# Patient Record
Sex: FEMALE | Race: WHITE | Employment: OTHER | URBAN - METROPOLITAN AREA
[De-identification: names, ages, dates, MRNs, and addresses within clinical notes are randomized per-mention and may not be internally consistent; named-entity substitution may affect disease eponyms.]

---

## 2017-06-02 ENCOUNTER — TELEPHONE (OUTPATIENT)
Dept: FAMILY MEDICINE CLINIC | Facility: CLINIC | Age: 82
End: 2017-06-02

## 2017-06-02 NOTE — TELEPHONE ENCOUNTER
Jacquelyn Cruz received the worst sun burn of her life a few days ago on her back, right at her waist.   I asked if she has put aloe or an after sun lotion on it and she state she as not put anything on it, but showed it to her friends and they told her that it wou

## 2017-06-02 NOTE — TELEPHONE ENCOUNTER
She can use over the counter Cortaid 10 cream or ointment on the sore areas twice a day for the next 3-4 days.

## 2017-07-24 ENCOUNTER — TELEPHONE (OUTPATIENT)
Dept: FAMILY MEDICINE CLINIC | Facility: CLINIC | Age: 82
End: 2017-07-24

## 2017-07-24 RX ORDER — AMOXICILLIN 500 MG/1
500 CAPSULE ORAL 3 TIMES DAILY
Qty: 30 CAPSULE | Refills: 0 | Status: SHIPPED | OUTPATIENT
Start: 2017-07-24 | End: 2017-08-03

## 2017-07-24 NOTE — TELEPHONE ENCOUNTER
Patient has had a cough and sore throat- would like to speak to nurse because family in encouraging her to address this

## 2017-07-24 NOTE — TELEPHONE ENCOUNTER
Patient states She has started with a thick/swollen/somewhat painful throat. Patient also continues with cough. Family requesting Patient to get treatment due to Son being in the Hospital.  Please advise.   Isaac Nj, 07/24/17, 4:26 PM

## 2017-08-28 ENCOUNTER — TELEPHONE (OUTPATIENT)
Dept: FAMILY MEDICINE CLINIC | Facility: CLINIC | Age: 82
End: 2017-08-28

## 2017-08-28 NOTE — TELEPHONE ENCOUNTER
Pt states she has had a chronic cough with clear mucous for months. Pt denies fever, states she wondered if she needed an inhaler. Pt informed appt advised. Appt given Tuesday with MT.     Future Appointments  Date Time Provider Ina Briscoe   8/29/

## 2017-08-29 ENCOUNTER — OFFICE VISIT (OUTPATIENT)
Dept: FAMILY MEDICINE CLINIC | Facility: CLINIC | Age: 82
End: 2017-08-29

## 2017-08-29 ENCOUNTER — HOSPITAL ENCOUNTER (OUTPATIENT)
Dept: GENERAL RADIOLOGY | Age: 82
Discharge: HOME | End: 2017-08-29
Attending: FAMILY MEDICINE
Payer: MEDICARE

## 2017-08-29 VITALS
SYSTOLIC BLOOD PRESSURE: 146 MMHG | OXYGEN SATURATION: 97 % | HEART RATE: 72 BPM | HEIGHT: 61.5 IN | DIASTOLIC BLOOD PRESSURE: 78 MMHG | RESPIRATION RATE: 16 BRPM | WEIGHT: 175.38 LBS | BODY MASS INDEX: 32.69 KG/M2 | TEMPERATURE: 99 F

## 2017-08-29 DIAGNOSIS — J40 BRONCHITIS: ICD-10-CM

## 2017-08-29 DIAGNOSIS — R05.9 COUGH: ICD-10-CM

## 2017-08-29 DIAGNOSIS — R05.9 COUGH: Primary | ICD-10-CM

## 2017-08-29 DIAGNOSIS — J30.1 CHRONIC SEASONAL ALLERGIC RHINITIS DUE TO POLLEN: ICD-10-CM

## 2017-08-29 PROBLEM — Z85.43 HISTORY OF OVARIAN CANCER: Status: ACTIVE | Noted: 2017-08-29

## 2017-08-29 PROBLEM — I51.89 DIASTOLIC DYSFUNCTION: Status: ACTIVE | Noted: 2017-08-29

## 2017-08-29 PROCEDURE — 71020 XR CHEST PA + LAT CHEST (CPT=71020): CPT | Performed by: FAMILY MEDICINE

## 2017-08-29 PROCEDURE — 99214 OFFICE O/P EST MOD 30 MIN: CPT | Performed by: FAMILY MEDICINE

## 2017-08-29 RX ORDER — OMEGA-3 FATTY ACIDS/FISH OIL 300-1000MG
1 CAPSULE ORAL AS NEEDED
COMMUNITY
Start: 2015-07-25 | End: 2018-11-10

## 2017-08-29 RX ORDER — GUARN/MA-HUANG/P.GIN/S.GINSENG
1 TABLET ORAL DAILY
COMMUNITY

## 2017-08-29 RX ORDER — ANASTROZOLE 1 MG/1
1 TABLET ORAL DAILY
COMMUNITY
Start: 2016-04-26 | End: 2018-01-02

## 2017-08-29 RX ORDER — BUDESONIDE AND FORMOTEROL FUMARATE DIHYDRATE 160; 4.5 UG/1; UG/1
2 AEROSOL RESPIRATORY (INHALATION) 2 TIMES DAILY
Qty: 1 INHALER | Refills: 5 | Status: SHIPPED | OUTPATIENT
Start: 2017-08-29 | End: 2018-08-06

## 2017-08-29 RX ORDER — MULTIVITAMIN
1 CAPSULE ORAL DAILY
COMMUNITY
Start: 2012-05-08

## 2017-08-29 RX ORDER — BISOPROLOL FUMARATE 5 MG/1
1 TABLET ORAL DAILY
COMMUNITY
Start: 2015-02-06 | End: 2017-09-06

## 2017-08-29 NOTE — PROGRESS NOTES
[facility] [place]  [place]ESS [place]  Chief Complaint:   Patient presents with:  Cough: spitting up mucous      HPI:   This is a 80year old female coming in for a cough.   She said that she has been coughing and spitting up a lot of phlegm over the MCG/ACT Inhalation Aerosol Inhale 2 puffs into the lungs 2 (two) times daily. Disp: 1 Inhaler Rfl: 5      Counseling given: Not Answered       REVIEW OF SYSTEMS:   CONSTITUTIONAL:  Denies unusual weight gain/loss, fever, chills, or fatigue.   EENT:  Eyes: age, well groomed. Physical Exam:  GEN:  Patient is alert, awake and oriented, well developed, well nourished, no apparent distress.   HEENT:  Head:  Normocephalic, atraumatic Eyes: EOMI, PERRLA, no scleral icterus, conjunctivae clear bilaterally, no eye d Patient/Caregiver Education: Patient/Caregiver Education: There are no barriers to learning. Medical education done. Outcome: Patient verbalizes understanding.  Patient is notified to call with any questions, complications, allergies, or worseni

## 2017-08-29 NOTE — PATIENT INSTRUCTIONS
Take over the counter Loratadine 10 mg daily from now until a hard frost.  Use Symbicort 2 puffs twice a day.

## 2017-09-06 RX ORDER — BISOPROLOL FUMARATE 5 MG/1
TABLET ORAL DAILY
Qty: 30 TABLET | Refills: 11 | Status: SHIPPED | OUTPATIENT
Start: 2017-09-06 | End: 2018-09-18

## 2017-10-17 ENCOUNTER — MED REC SCAN ONLY (OUTPATIENT)
Dept: FAMILY MEDICINE CLINIC | Facility: CLINIC | Age: 82
End: 2017-10-17

## 2017-10-24 ENCOUNTER — TELEPHONE (OUTPATIENT)
Dept: FAMILY MEDICINE CLINIC | Facility: CLINIC | Age: 82
End: 2017-10-24

## 2017-10-24 DIAGNOSIS — Z85.3 HISTORY OF BREAST CANCER IN FEMALE: Primary | ICD-10-CM

## 2017-10-24 DIAGNOSIS — C50.911 MALIGNANT NEOPLASM OF RIGHT FEMALE BREAST, UNSPECIFIED ESTROGEN RECEPTOR STATUS, UNSPECIFIED SITE OF BREAST (HCC): ICD-10-CM

## 2017-10-24 NOTE — TELEPHONE ENCOUNTER
Order faxed to [facility] Dept and Patient Scheduling.   Curtis Pascual, 10/24/17, 10:30 AM

## 2017-10-24 NOTE — TELEPHONE ENCOUNTER
Per Gemma Solo-  Patient scheduled for routing mammogram today. Requesting order for Bilateral Diagnostic Mammogram.  Fax to: [phone].

## 2017-11-28 ENCOUNTER — OFFICE VISIT (OUTPATIENT)
Dept: FAMILY MEDICINE CLINIC | Facility: CLINIC | Age: 82
End: 2017-11-28

## 2017-11-28 VITALS
TEMPERATURE: 98 F | BODY MASS INDEX: 32.69 KG/M2 | WEIGHT: 175.38 LBS | DIASTOLIC BLOOD PRESSURE: 88 MMHG | RESPIRATION RATE: 16 BRPM | HEIGHT: 61.5 IN | SYSTOLIC BLOOD PRESSURE: 160 MMHG | HEART RATE: 66 BPM

## 2017-11-28 DIAGNOSIS — R30.0 DYSURIA: ICD-10-CM

## 2017-11-28 DIAGNOSIS — I10 BENIGN ESSENTIAL HYPERTENSION: ICD-10-CM

## 2017-11-28 DIAGNOSIS — H25.13 AGE-RELATED NUCLEAR CATARACT OF BOTH EYES: ICD-10-CM

## 2017-11-28 DIAGNOSIS — Z85.43 HISTORY OF OVARIAN CANCER: ICD-10-CM

## 2017-11-28 DIAGNOSIS — Z17.0 MALIGNANT NEOPLASM OF UPPER-OUTER QUADRANT OF RIGHT BREAST IN FEMALE, ESTROGEN RECEPTOR POSITIVE (HCC): ICD-10-CM

## 2017-11-28 DIAGNOSIS — C50.411 MALIGNANT NEOPLASM OF UPPER-OUTER QUADRANT OF RIGHT BREAST IN FEMALE, ESTROGEN RECEPTOR POSITIVE (HCC): ICD-10-CM

## 2017-11-28 DIAGNOSIS — Z01.818 PREOP EXAMINATION: Primary | ICD-10-CM

## 2017-11-28 PROCEDURE — 93000 ELECTROCARDIOGRAM COMPLETE: CPT | Performed by: FAMILY MEDICINE

## 2017-11-28 PROCEDURE — 81003 URINALYSIS AUTO W/O SCOPE: CPT | Performed by: FAMILY MEDICINE

## 2017-11-28 PROCEDURE — 99214 OFFICE O/P EST MOD 30 MIN: CPT | Performed by: FAMILY MEDICINE

## 2017-11-28 RX ORDER — CIPROFLOXACIN 250 MG/1
250 TABLET, FILM COATED ORAL 2 TIMES DAILY
Qty: 10 TABLET | Refills: 0 | Status: SHIPPED | OUTPATIENT
Start: 2017-11-28 | End: 2017-12-03

## 2017-11-28 RX ORDER — KETOROLAC TROMETHAMINE 5 MG/ML
1 SOLUTION OPHTHALMIC 2 TIMES DAILY
Refills: 3 | COMMUNITY
Start: 2017-11-17 | End: 2018-06-26 | Stop reason: ALTCHOICE

## 2017-11-28 NOTE — PROGRESS NOTES
[facility]  [facility]  Chief Complaint:   Patient presents with:  Pre-Op Exam      HPI:   This is a 80year old female coming in for her preoperative examination for bilateral cataract surgery.   The surgery will be performed by Dr. Lemus Part SURGERY      Comment: Incarcerated.   1995: HERNIA SURGERY  No date: HYSTERECTOMY  1999: KNEE REPLACEMENT SURGERY Right  2012: KNEE REPLACEMENT SURGERY Left  No date: REMOVAL OF OVARIAN CYST(S)  Social History:  Smoking status: Never Smoker wheezing, cough or sputum. GASTROINTESTINAL:  Denies abdominal pain, nausea, vomiting, constipation, diarrhea, or blood in stool. MUSCULOSKELETAL:  Denies weakness, muscle aches, back pain, joint pain, swelling or stiffness.   NEUROLOGICAL:  Denies headac nontender, bowel sounds normal in all 4 quadrants, no masses, no hepatosplenomegaly. BACK: Significant kyphosis noted. She does have some discomfort with flexion.   EXTREMITIES:  No edema, no cyanosis, no clubbing, FROM, 2+ dorsalis pedis pulses bilateral to call with any side effects or complications from the treatments as a result of today.      Problem List:  Patient Active Problem List:     Cough     History of DVT (deep vein thrombosis)     Edema     Routine general medical examination at a [facility]

## 2017-12-22 ENCOUNTER — TELEPHONE (OUTPATIENT)
Dept: FAMILY MEDICINE CLINIC | Facility: CLINIC | Age: 82
End: 2017-12-22

## 2017-12-22 DIAGNOSIS — N39.0 URINARY TRACT INFECTION WITHOUT HEMATURIA, SITE UNSPECIFIED: Primary | ICD-10-CM

## 2017-12-22 NOTE — TELEPHONE ENCOUNTER
Pt had recent UTI would like to check urine to ensure it is resolved. Having some increased urinary frequency--chronic issue per pt. Order placed.   Supplies to collect home given per pt request.

## 2017-12-23 ENCOUNTER — LAB ENCOUNTER (OUTPATIENT)
Dept: LAB | Age: 82
End: 2017-12-23
Attending: FAMILY MEDICINE
Payer: MEDICARE

## 2017-12-23 DIAGNOSIS — N39.0 URINARY TRACT INFECTION WITHOUT HEMATURIA, SITE UNSPECIFIED: ICD-10-CM

## 2017-12-23 PROCEDURE — 87184 SC STD DISK METHOD PER PLATE: CPT

## 2017-12-23 PROCEDURE — 87086 URINE CULTURE/COLONY COUNT: CPT

## 2017-12-23 PROCEDURE — 87088 URINE BACTERIA CULTURE: CPT

## 2017-12-23 PROCEDURE — 87077 CULTURE AEROBIC IDENTIFY: CPT

## 2017-12-23 PROCEDURE — 87186 SC STD MICRODIL/AGAR DIL: CPT

## 2017-12-23 PROCEDURE — 81001 URINALYSIS AUTO W/SCOPE: CPT

## 2017-12-28 ENCOUNTER — TELEPHONE (OUTPATIENT)
Dept: FAMILY MEDICINE CLINIC | Facility: CLINIC | Age: 82
End: 2017-12-28

## 2017-12-28 NOTE — TELEPHONE ENCOUNTER
Patient notified of results and recommendations. Notified Rx sent to [facility]. Patient expressed understanding and thanks.

## 2017-12-28 NOTE — TELEPHONE ENCOUNTER
----- Message from SVITLANA Johnson sent at 12/28/2017  8:18 AM CST -----  UTI still present, pt to start bactrim bid x 10 days. Repeat urinalysis reflex cx in 2 weeks. Orders placed.

## 2018-01-02 ENCOUNTER — OFFICE VISIT (OUTPATIENT)
Dept: FAMILY MEDICINE CLINIC | Facility: CLINIC | Age: 83
End: 2018-01-02

## 2018-01-02 VITALS
DIASTOLIC BLOOD PRESSURE: 78 MMHG | RESPIRATION RATE: 16 BRPM | TEMPERATURE: 98 F | HEART RATE: 80 BPM | BODY MASS INDEX: 31.43 KG/M2 | SYSTOLIC BLOOD PRESSURE: 134 MMHG | HEIGHT: 63 IN | WEIGHT: 177.38 LBS

## 2018-01-02 DIAGNOSIS — M54.50 CHRONIC MIDLINE LOW BACK PAIN WITHOUT SCIATICA: ICD-10-CM

## 2018-01-02 DIAGNOSIS — I10 BENIGN ESSENTIAL HYPERTENSION: ICD-10-CM

## 2018-01-02 DIAGNOSIS — R60.9 EDEMA, UNSPECIFIED TYPE: ICD-10-CM

## 2018-01-02 DIAGNOSIS — L03.115 CELLULITIS OF RIGHT LOWER EXTREMITY: Primary | ICD-10-CM

## 2018-01-02 DIAGNOSIS — G89.29 CHRONIC MIDLINE LOW BACK PAIN WITHOUT SCIATICA: ICD-10-CM

## 2018-01-02 DIAGNOSIS — I87.2 VENOUS STASIS DERMATITIS OF BOTH LOWER EXTREMITIES: ICD-10-CM

## 2018-01-02 PROCEDURE — 99214 OFFICE O/P EST MOD 30 MIN: CPT | Performed by: FAMILY MEDICINE

## 2018-01-02 RX ORDER — CEPHALEXIN 500 MG/1
1000 CAPSULE ORAL 2 TIMES DAILY
Qty: 40 CAPSULE | Refills: 0 | Status: SHIPPED | OUTPATIENT
Start: 2018-01-02 | End: 2018-06-26 | Stop reason: ALTCHOICE

## 2018-01-03 NOTE — PROGRESS NOTES
[facility] [place]  PROGRESS NOTE  Chief Complaint:   Patient presents with:  Leg Pain      HPI:   This is a 80year old female coming in for pain, redness, swelling in both lower legs.   She has had this many times in the past.  However, she has Pending    Urine Culture >100,000 CFU/ML Escherichia coli (A)    Urine Culture >100,000 CFU/ML Escherichia coli (A)    *Culture results found, see result in Chart Review         Past Medical History:   Diagnosis Date   • Benign essential hypertension 5/8/2 chills, or fatigue. EENT:  Eyes:  Denies eye pain, visual loss, blurred vision, double vision or yellow sclerae. Ears, Nose, Throat:  Denies hearing loss, sneezing, congestion, runny nose or sore throat.   INTEGUMENTARY:  Denies rashes, itching, skin lesio or exudate. Mouth:  No oral lesions or ulcerations, good dentition. NECK: Supple, no CLAD, no JVD, no thyromegaly. SKIN: No rashes, no skin lesion, no bruising, good turgor. HEART:  Regular rate and rhythm, no murmurs, rubs or gallops.   LUNGS: Clear to cephALEXin 500 MG Oral Cap 40 capsule 0      Sig: Take 2 capsules (1,000 mg total) by mouth 2 (two) times daily. Patient/Caregiver Education: Patient/Caregiver Education: There are no barriers to learning. Medical education done.    Outcome: Beth

## 2018-02-05 ENCOUNTER — TELEPHONE (OUTPATIENT)
Dept: FAMILY MEDICINE CLINIC | Facility: CLINIC | Age: 83
End: 2018-02-05

## 2018-02-05 NOTE — TELEPHONE ENCOUNTER
Patient will drop off the paperwork for handicap placard for Dr Burroughs November to fill out.   Jamal Salinas, 02/05/18, 1:55 PM

## 2018-02-12 ENCOUNTER — MED REC SCAN ONLY (OUTPATIENT)
Dept: FAMILY MEDICINE CLINIC | Facility: CLINIC | Age: 83
End: 2018-02-12

## 2018-06-22 ENCOUNTER — TELEPHONE (OUTPATIENT)
Dept: FAMILY MEDICINE CLINIC | Facility: CLINIC | Age: 83
End: 2018-06-22

## 2018-06-22 NOTE — TELEPHONE ENCOUNTER
Patient states She has been having increased lower back/sciatic pain. Will be seeing someone on Monday for back pain. States urinary frequency persists. Requesting appt for Medicare Physical.  Wanting UA run also.     Appt given Tues 6/26 2pm with Dr Kelly Schmitt

## 2018-06-26 ENCOUNTER — TELEPHONE (OUTPATIENT)
Dept: FAMILY MEDICINE CLINIC | Facility: CLINIC | Age: 83
End: 2018-06-26

## 2018-06-26 ENCOUNTER — OFFICE VISIT (OUTPATIENT)
Dept: FAMILY MEDICINE CLINIC | Facility: CLINIC | Age: 83
End: 2018-06-26

## 2018-06-26 ENCOUNTER — APPOINTMENT (OUTPATIENT)
Dept: LAB | Age: 83
End: 2018-06-26
Attending: FAMILY MEDICINE
Payer: MEDICARE

## 2018-06-26 VITALS
DIASTOLIC BLOOD PRESSURE: 80 MMHG | RESPIRATION RATE: 14 BRPM | HEART RATE: 70 BPM | HEIGHT: 63 IN | BODY MASS INDEX: 32.29 KG/M2 | WEIGHT: 182.25 LBS | TEMPERATURE: 98 F | SYSTOLIC BLOOD PRESSURE: 140 MMHG

## 2018-06-26 DIAGNOSIS — R30.0 DYSURIA: ICD-10-CM

## 2018-06-26 DIAGNOSIS — I87.2 VENOUS STASIS DERMATITIS OF BOTH LOWER EXTREMITIES: ICD-10-CM

## 2018-06-26 DIAGNOSIS — R60.9 EDEMA, UNSPECIFIED TYPE: ICD-10-CM

## 2018-06-26 DIAGNOSIS — I10 BENIGN ESSENTIAL HYPERTENSION: ICD-10-CM

## 2018-06-26 DIAGNOSIS — Z13.6 SCREENING FOR CARDIOVASCULAR CONDITION: ICD-10-CM

## 2018-06-26 DIAGNOSIS — Z00.00 ENCOUNTER FOR ANNUAL HEALTH EXAMINATION: Primary | ICD-10-CM

## 2018-06-26 DIAGNOSIS — Z17.0 MALIGNANT NEOPLASM OF UPPER-OUTER QUADRANT OF RIGHT BREAST IN FEMALE, ESTROGEN RECEPTOR POSITIVE (HCC): ICD-10-CM

## 2018-06-26 DIAGNOSIS — J30.1 CHRONIC SEASONAL ALLERGIC RHINITIS DUE TO POLLEN: ICD-10-CM

## 2018-06-26 DIAGNOSIS — I51.89 DIASTOLIC DYSFUNCTION: ICD-10-CM

## 2018-06-26 DIAGNOSIS — C50.411 MALIGNANT NEOPLASM OF UPPER-OUTER QUADRANT OF RIGHT BREAST IN FEMALE, ESTROGEN RECEPTOR POSITIVE (HCC): ICD-10-CM

## 2018-06-26 PROCEDURE — G0439 PPPS, SUBSEQ VISIT: HCPCS | Performed by: FAMILY MEDICINE

## 2018-06-26 PROCEDURE — 84443 ASSAY THYROID STIM HORMONE: CPT | Performed by: FAMILY MEDICINE

## 2018-06-26 PROCEDURE — 99214 OFFICE O/P EST MOD 30 MIN: CPT | Performed by: FAMILY MEDICINE

## 2018-06-26 PROCEDURE — 36415 COLL VENOUS BLD VENIPUNCTURE: CPT | Performed by: FAMILY MEDICINE

## 2018-06-26 PROCEDURE — 80061 LIPID PANEL: CPT | Performed by: FAMILY MEDICINE

## 2018-06-26 PROCEDURE — 81003 URINALYSIS AUTO W/O SCOPE: CPT | Performed by: FAMILY MEDICINE

## 2018-06-26 PROCEDURE — 80053 COMPREHEN METABOLIC PANEL: CPT | Performed by: FAMILY MEDICINE

## 2018-06-26 PROCEDURE — 85025 COMPLETE CBC W/AUTO DIFF WBC: CPT | Performed by: FAMILY MEDICINE

## 2018-06-26 RX ORDER — AMOXICILLIN 500 MG/1
CAPSULE ORAL
Refills: 0 | COMMUNITY
Start: 2018-04-27 | End: 2018-09-21

## 2018-06-26 RX ORDER — CIPROFLOXACIN 250 MG/1
250 TABLET, FILM COATED ORAL 2 TIMES DAILY
Qty: 20 TABLET | Refills: 0 | Status: SHIPPED | OUTPATIENT
Start: 2018-06-26 | End: 2018-07-06

## 2018-06-26 RX ORDER — FUROSEMIDE 20 MG/1
20 TABLET ORAL DAILY
Qty: 30 TABLET | Refills: 6 | Status: SHIPPED | OUTPATIENT
Start: 2018-06-26 | End: 2018-09-21

## 2018-06-26 NOTE — TELEPHONE ENCOUNTER
But he was able to drop off a urine sample today. Her urine does show a urinary tract infection. We will send in a prescription for Cipro for her to the pharmacy.

## 2018-06-26 NOTE — PATIENT INSTRUCTIONS
Take Furosemide 20 mg daily to help with swelling. Recommended Websites for Advanced Directives    [email or web address] org/publications/Documents/personal_[email or web address]. pdf  An information packet, including necessary form from the [facility]

## 2018-06-26 NOTE — PROGRESS NOTES
[facility] [place]  PROGRESS NOTE  Chief Complaint:   Patient presents with:  Physical      HPI:   This is a 80year old female coming in for her annual Medicare wellness exam.    She has several concerns.   She is finding it more more difficult Escherichia coli (A)    *Culture results found, see result in Chart Review         Past Medical History:   Diagnosis Date   • Benign essential hypertension 5/8/2012   • Edema 5/8/2012   • History of DVT (deep vein thrombosis) 5/8/2012   • Low back pain 8/1 runny nose or sore throat. INTEGUMENTARY:  Denies rashes, itching, skin lesion, or excessive skin dryness.   CARDIOVASCULAR:  Denies chest pain, chest pressure, chest discomfort, palpitations, edema, dyspnea on exertion or at rest.  RESPIRATORY:  Denies sh no JVD, no thyromegaly. SKIN: No rashes, no skin lesion, no bruising, good turgor. HEART:  Regular rate and rhythm, no murmurs, rubs or gallops. LUNGS: Clear to auscultation bilterally, no rales/rhonchi/wheezing.   Breasts: She has a surgical scar in the OFFICE/OUTPT VISIT,EST,LEVL IV    6.  Malignant neoplasm of upper-outer quadrant of right breast in female, estrogen receptor positive ([address]  She has a previous history of malignant neoplasm of the right breast.  There is no evidence for recurrence on physi

## 2018-06-27 ENCOUNTER — TELEPHONE (OUTPATIENT)
Dept: FAMILY MEDICINE CLINIC | Facility: CLINIC | Age: 83
End: 2018-06-27

## 2018-06-27 NOTE — TELEPHONE ENCOUNTER
----- Message from Davonna Sacks sent at 6/27/2018  4:10 PM CDT -----  Contact: patient   [facility]

## 2018-06-27 NOTE — TELEPHONE ENCOUNTER
----- Message from Janay Blanco MD sent at 6/27/2018  8:06 AM CDT -----  Please call [facility]. Her cholesterol is 143. That is really good. Her HDL cholesterol, the good one, is high at 66.   The LDL cholesterol, the one that increases the risk of hear

## 2018-08-06 RX ORDER — BUDESONIDE AND FORMOTEROL FUMARATE DIHYDRATE 160; 4.5 UG/1; UG/1
AEROSOL RESPIRATORY (INHALATION)
Qty: 1 INHALER | Refills: 11 | Status: SHIPPED | OUTPATIENT
Start: 2018-08-06 | End: 2019-10-03

## 2018-08-06 NOTE — TELEPHONE ENCOUNTER
Future appt:    Last Appointment:  6/26/2018    Cholesterol, Total (mg/dL)   Date Value   06/26/2018 143   ----------  HDL Cholesterol (mg/dL)   Date Value   06/26/2018 66   ----------  LDL Cholesterol (mg/dL)   Date Value   06/26/2018 64   ----------  Tri

## 2018-09-18 RX ORDER — BISOPROLOL FUMARATE 5 MG/1
TABLET ORAL
Qty: 90 TABLET | Refills: 3 | Status: SHIPPED | OUTPATIENT
Start: 2018-09-18 | End: 2019-09-09

## 2018-09-18 NOTE — TELEPHONE ENCOUNTER
Future appt:    Last Appointment:  6/26/2018  Cholesterol, Total (mg/dL)   Date Value   06/26/2018 143     HDL Cholesterol (mg/dL)   Date Value   06/26/2018 66     LDL Cholesterol (mg/dL)   Date Value   06/26/2018 64     Triglycerides (mg/dL)   Date Value

## 2018-09-21 ENCOUNTER — APPOINTMENT (OUTPATIENT)
Dept: LAB | Age: 83
End: 2018-09-21
Attending: FAMILY MEDICINE
Payer: MEDICARE

## 2018-09-21 ENCOUNTER — OFFICE VISIT (OUTPATIENT)
Dept: FAMILY MEDICINE CLINIC | Facility: CLINIC | Age: 83
End: 2018-09-21
Payer: MEDICARE

## 2018-09-21 ENCOUNTER — TELEPHONE (OUTPATIENT)
Dept: FAMILY MEDICINE CLINIC | Facility: CLINIC | Age: 83
End: 2018-09-21

## 2018-09-21 VITALS
RESPIRATION RATE: 18 BRPM | TEMPERATURE: 98 F | DIASTOLIC BLOOD PRESSURE: 72 MMHG | BODY MASS INDEX: 31.43 KG/M2 | HEART RATE: 60 BPM | SYSTOLIC BLOOD PRESSURE: 142 MMHG | WEIGHT: 177.38 LBS | HEIGHT: 63 IN

## 2018-09-21 DIAGNOSIS — L03.119 CELLULITIS OF LOWER EXTREMITY, UNSPECIFIED LATERALITY: ICD-10-CM

## 2018-09-21 DIAGNOSIS — R60.9 EDEMA, UNSPECIFIED TYPE: Primary | ICD-10-CM

## 2018-09-21 DIAGNOSIS — R60.9 EDEMA, UNSPECIFIED TYPE: ICD-10-CM

## 2018-09-21 LAB
ALBUMIN SERPL-MCNC: 3.3 G/DL (ref 3.5–4.8)
ALBUMIN/GLOB SERPL: 0.8 {RATIO} (ref 1–2)
ALP LIVER SERPL-CCNC: 109 U/L (ref 55–142)
ALT SERPL-CCNC: 22 U/L (ref 14–54)
ANION GAP SERPL CALC-SCNC: 4 MMOL/L (ref 0–18)
AST SERPL-CCNC: 19 U/L (ref 15–41)
BILIRUB SERPL-MCNC: 0.5 MG/DL (ref 0.1–2)
BUN BLD-MCNC: 18 MG/DL (ref 8–20)
BUN/CREAT SERPL: 22.2 (ref 10–20)
CALCIUM BLD-MCNC: 8.7 MG/DL (ref 8.3–10.3)
CHLORIDE SERPL-SCNC: 110 MMOL/L (ref 101–111)
CO2 SERPL-SCNC: 27 MMOL/L (ref 22–32)
CREAT BLD-MCNC: 0.81 MG/DL (ref 0.55–1.02)
GLOBULIN PLAS-MCNC: 4.2 G/DL (ref 2.5–4)
GLUCOSE BLD-MCNC: 89 MG/DL (ref 70–99)
M PROTEIN MFR SERPL ELPH: 7.5 G/DL (ref 6.1–8.3)
OSMOLALITY SERPL CALC.SUM OF ELEC: 293 MOSM/KG (ref 275–295)
POTASSIUM SERPL-SCNC: 4.3 MMOL/L (ref 3.6–5.1)
SODIUM SERPL-SCNC: 141 MMOL/L (ref 136–144)

## 2018-09-21 PROCEDURE — 36415 COLL VENOUS BLD VENIPUNCTURE: CPT

## 2018-09-21 PROCEDURE — 80053 COMPREHEN METABOLIC PANEL: CPT

## 2018-09-21 PROCEDURE — 99215 OFFICE O/P EST HI 40 MIN: CPT | Performed by: FAMILY MEDICINE

## 2018-09-21 RX ORDER — POTASSIUM CHLORIDE 750 MG/1
10 TABLET, FILM COATED, EXTENDED RELEASE ORAL DAILY
Qty: 15 TABLET | Refills: 0 | Status: SHIPPED | OUTPATIENT
Start: 2018-09-21 | End: 2018-10-01

## 2018-09-21 RX ORDER — FUROSEMIDE 20 MG/1
20 TABLET ORAL 2 TIMES DAILY
Qty: 30 TABLET | Refills: 6 | COMMUNITY
Start: 2018-09-21 | End: 2018-10-18

## 2018-09-21 RX ORDER — CEPHALEXIN 500 MG/1
500 CAPSULE ORAL 3 TIMES DAILY
Qty: 30 CAPSULE | Refills: 0 | Status: SHIPPED | OUTPATIENT
Start: 2018-09-21 | End: 2018-09-25

## 2018-09-21 NOTE — PATIENT INSTRUCTIONS
Check Venous doppler US today at [address] [place] [address] Street.   Start Cephalexin three times a day for 10 days. Also increase furosemide 20 mg twice a day for 2 weeks. Start potassium 10 meq once a day for 2 weeks.    Follow up with Dr Janna Flowers or me in 7-10 da

## 2018-09-21 NOTE — PROGRESS NOTES
[facility] [place]  PROGRESS NOTE  Chief Complaint:   Patient presents with:  Redness: redness in legs for this week, monday      HPI:   This is a 80year old female presents to clinic complaining of lower extremities swelling and redness that s mouth daily. Disp: 15 tablet Rfl: 0   furosemide 20 MG Oral Tab Take 1 tablet (20 mg total) by mouth 2 (two) times daily.  Disp: 30 tablet Rfl: 6   BISOPROLOL FUMARATE 5 MG Oral Tab TAKE ONE TABLET BY MOUTH EVERY DAY Disp: 90 tablet Rfl: 3   SYMBICORT 160-4 signs reviewed. Physical Exam:  GEN:  Patient is alert, awake and oriented, well developed, well nourished, no acute distress. EYES:  Sclera anicteric, conjunctiva normal, PERRLA, EOMI. LUNGS: Clear to auscultation bilterally, no rales/rhonchi/wheezing. Risk(1 of 2 - PCV13) due on 03/11/1999  Influenza Vaccine(1) due on 09/01/2018    Patient/Caregiver Education: Patient/Caregiver Education: There are no barriers to learning. Medical education done. Outcome: Patient verbalizes understanding.  Patient is n

## 2018-09-21 NOTE — TELEPHONE ENCOUNTER
Pt called stating her leg are more red the last few days. Pt states she has issues with her legs (red and hardness) but is more red then normal.    Dr. Harjit Chilel has no appt today schedule with Dr. Spencer Torres.     Future Appointments   Date Time Provider Depa

## 2018-09-24 ENCOUNTER — TELEPHONE (OUTPATIENT)
Dept: FAMILY MEDICINE CLINIC | Facility: CLINIC | Age: 83
End: 2018-09-24

## 2018-09-24 NOTE — TELEPHONE ENCOUNTER
----- Message from Peter Pulido MD sent at 9/24/2018  8:27 AM CDT -----  Please inform patient that CMP done here in clinic shows normal labs. Patient was also recently hospitalized for bilateral DVT, patient was discharged from hospital yesterday.   Francis

## 2018-09-24 NOTE — TELEPHONE ENCOUNTER
Pt notified and verbalized understanding. She states that she is feeling a little tired today but better then before. She states that she scheduled an appt with Dr. Mickey Hatch.     Future Appointments   Date Time Provider nIa Briscoe   9/25/2018  1:00 P

## 2018-09-25 ENCOUNTER — OFFICE VISIT (OUTPATIENT)
Dept: FAMILY MEDICINE CLINIC | Facility: CLINIC | Age: 83
End: 2018-09-25
Payer: MEDICARE

## 2018-09-25 VITALS
BODY MASS INDEX: 32.43 KG/M2 | RESPIRATION RATE: 16 BRPM | HEART RATE: 96 BPM | WEIGHT: 174 LBS | DIASTOLIC BLOOD PRESSURE: 70 MMHG | HEIGHT: 61.5 IN | SYSTOLIC BLOOD PRESSURE: 140 MMHG | TEMPERATURE: 99 F

## 2018-09-25 DIAGNOSIS — I10 BENIGN ESSENTIAL HYPERTENSION: ICD-10-CM

## 2018-09-25 DIAGNOSIS — Z17.0 MALIGNANT NEOPLASM OF UPPER-OUTER QUADRANT OF RIGHT BREAST IN FEMALE, ESTROGEN RECEPTOR POSITIVE (HCC): ICD-10-CM

## 2018-09-25 DIAGNOSIS — I82.433 ACUTE DEEP VEIN THROMBOSIS (DVT) OF POPLITEAL VEIN OF BOTH LOWER EXTREMITIES (HCC): Primary | ICD-10-CM

## 2018-09-25 DIAGNOSIS — C50.411 MALIGNANT NEOPLASM OF UPPER-OUTER QUADRANT OF RIGHT BREAST IN FEMALE, ESTROGEN RECEPTOR POSITIVE (HCC): ICD-10-CM

## 2018-09-25 PROBLEM — M19.90 OSTEOARTHROSIS: Status: ACTIVE | Noted: 2018-09-25

## 2018-09-25 PROBLEM — I49.9 CARDIAC ARRHYTHMIA: Status: ACTIVE | Noted: 2018-09-25

## 2018-09-25 PROBLEM — I82.403 DVT, BILATERAL LOWER LIMBS (HCC): Status: ACTIVE | Noted: 2018-09-21

## 2018-09-25 PROCEDURE — 99214 OFFICE O/P EST MOD 30 MIN: CPT | Performed by: FAMILY MEDICINE

## 2018-09-25 PROCEDURE — 1111F DSCHRG MED/CURRENT MED MERGE: CPT | Performed by: FAMILY MEDICINE

## 2018-09-25 RX ORDER — CEPHALEXIN 500 MG/1
500 CAPSULE ORAL 3 TIMES DAILY
COMMUNITY
End: 2018-10-03

## 2018-09-25 NOTE — PROGRESS NOTES
[facility]  [facility]  Chief Complaint:   Patient presents with:  Hospital F/U      HPI:   This is a 80year old female coming in for hospital follow-up. She was seen in the office on September 21 with redness and pain in her legs. Non- 67 >=60    GFR, -American 77 >=60    AST 19 15 - 41 U/L    Alt 22 14 - 54 U/L    Alkaline Phosphatase 109 55 - 142 U/L    Bilirubin, Total 0.5 0.1 - 2.0 mg/dL    Total Protein 7.5 6.1 - 8.3 g/dL    Albumin 3.3 (L) 3.5 - 4.8 g/dL Oral Cap Take 1 capsule by mouth daily. Disp:  Rfl:    Calcium 600-200 MG-UNIT Oral Tab Take 1 tablet by mouth daily.  Disp:  Rfl:       Counseling given: Not Answered       REVIEW OF SYSTEMS:   CONSTITUTIONAL: She has been extra tired since her hospital di walker. She is awake and alert. She is very hard of hearing. She walks with a walker.   HEENT:  Head:  Normocephalic, atraumatic Eyes: EOMI, PERRLA, no scleral icterus, conjunctivae clear bilaterally, no eye discharge Ears: External normal. Nose: patent, 03/11/1999  Mammogram due on 10/24/2018    Patient/Caregiver Education: Patient/Caregiver Education: There are no barriers to learning. Medical education done. Outcome: Patient verbalizes understanding.  Patient is notified to call with any questions, com

## 2018-10-02 RX ORDER — POTASSIUM CHLORIDE 750 MG/1
10 TABLET, FILM COATED, EXTENDED RELEASE ORAL DAILY
Qty: 90 TABLET | Refills: 3 | Status: SHIPPED | OUTPATIENT
Start: 2018-10-02 | End: 2019-09-19

## 2018-10-02 NOTE — TELEPHONE ENCOUNTER
Future appt:    Last Appointment:  9/21/2018  Cholesterol, Total (mg/dL)   Date Value   06/26/2018 143     HDL Cholesterol (mg/dL)   Date Value   06/26/2018 66     LDL Cholesterol (mg/dL)   Date Value   06/26/2018 64     Triglycerides (mg/dL)   Date Value

## 2018-10-05 ENCOUNTER — TELEPHONE (OUTPATIENT)
Dept: FAMILY MEDICINE CLINIC | Facility: CLINIC | Age: 83
End: 2018-10-05

## 2018-10-05 RX ORDER — INHALER, ASSIST DEVICES
1 SPACER (EA) MISCELLANEOUS AS NEEDED
Qty: 1 DEVICE | Refills: 0 | Status: SHIPPED | OUTPATIENT
Start: 2018-10-05

## 2018-10-05 RX ORDER — INHALER, ASSIST DEVICES
1 SPACER (EA) MISCELLANEOUS AS NEEDED
COMMUNITY
Start: 2018-10-05 | End: 2018-10-05

## 2018-10-09 ENCOUNTER — TELEPHONE (OUTPATIENT)
Dept: FAMILY MEDICINE CLINIC | Facility: CLINIC | Age: 83
End: 2018-10-09

## 2018-10-09 ENCOUNTER — OFFICE VISIT (OUTPATIENT)
Dept: FAMILY MEDICINE CLINIC | Facility: CLINIC | Age: 83
End: 2018-10-09
Payer: MEDICARE

## 2018-10-09 VITALS
BODY MASS INDEX: 32.85 KG/M2 | WEIGHT: 176.25 LBS | HEIGHT: 61.5 IN | RESPIRATION RATE: 14 BRPM | HEART RATE: 70 BPM | DIASTOLIC BLOOD PRESSURE: 72 MMHG | SYSTOLIC BLOOD PRESSURE: 128 MMHG | TEMPERATURE: 98 F

## 2018-10-09 DIAGNOSIS — L03.115 CELLULITIS OF RIGHT LOWER EXTREMITY: Primary | ICD-10-CM

## 2018-10-09 DIAGNOSIS — I87.2 VENOUS STASIS DERMATITIS OF BOTH LOWER EXTREMITIES: ICD-10-CM

## 2018-10-09 PROBLEM — D68.51 HETEROZYGOUS FACTOR V LEIDEN MUTATION (HCC): Status: ACTIVE | Noted: 2018-09-25

## 2018-10-09 PROCEDURE — 99213 OFFICE O/P EST LOW 20 MIN: CPT | Performed by: FAMILY MEDICINE

## 2018-10-09 RX ORDER — ANASTROZOLE 1 MG/1
1 TABLET ORAL DAILY
COMMUNITY
Start: 2018-10-05 | End: 2021-03-12

## 2018-10-09 RX ORDER — AMOXICILLIN AND CLAVULANATE POTASSIUM 875; 125 MG/1; MG/1
1 TABLET, FILM COATED ORAL 2 TIMES DAILY
Qty: 20 TABLET | Refills: 0 | Status: SHIPPED | OUTPATIENT
Start: 2018-10-09 | End: 2018-10-19

## 2018-10-09 NOTE — PROGRESS NOTES
[facility] [place]  PROGRESS NOTE  Chief Complaint:   Patient presents with:  Leg Pain: Left leg redness, uncomftorable to walk      HPI:   This is a 80year old female coming in for redness and discomfort in her leg.   She said she finished taki Left 2012   • REMOVAL OF OVARIAN CYST(S)       Social History:  Social History    Tobacco Use      Smoking status: Never Smoker      Smokeless tobacco: Never Used    Alcohol use: No    Drug use: No    Family History:  No family history on file.   Allergies: Denies chest pain, chest pressure, chest discomfort, palpitations, edema, dyspnea on exertion or at rest.  RESPIRATORY:  Denies shortness of breath, wheezing, cough or sputum.   GASTROINTESTINAL:  Denies abdominal pain, nausea, vomiting, constipation, diarr rales/rhonchi/wheezing. ABDOMEN:  Soft, nondistended, nontender, bowel sounds normal in all 4 quadrants, no masses, no hepatosplenomegaly.   EXTREMITIES: Right leg: Small amount of redness noted from the mid calf down to the top of the ankle, especially po cancer     Chronic seasonal allergic rhinitis due to pollen     Bronchitis     Preop examination     Age-related nuclear cataract of both eyes     Dysuria     Cellulitis of right lower extremity     Venous stasis dermatitis of both lower extremities     Ca

## 2018-10-09 NOTE — TELEPHONE ENCOUNTER
Patient states She finished Keflex last week. States her left leg remains with some redness and pain. Requesting evaluated. Appt given today 3pm with Dr Quintin Mclean.   Shanna Herron, 10/09/18, 9:17 AM

## 2018-10-18 ENCOUNTER — TELEPHONE (OUTPATIENT)
Dept: FAMILY MEDICINE CLINIC | Facility: CLINIC | Age: 83
End: 2018-10-18

## 2018-10-18 RX ORDER — FUROSEMIDE 20 MG/1
20 TABLET ORAL 2 TIMES DAILY
Qty: 60 TABLET | Refills: 1 | Status: SHIPPED | OUTPATIENT
Start: 2018-10-18 | End: 2019-01-07

## 2018-10-18 NOTE — TELEPHONE ENCOUNTER
No future appointments. Return in about 2 months (around 11/25/2018). Patient was just seen one time by Dr Peyton Noble and Dr Peyton Noble did not refill this medication and has seen Dr Gretel Madera recently.

## 2018-10-18 NOTE — TELEPHONE ENCOUNTER
Detailed message left that the RF has been given. Asked for a c/b if patient needs any further assistance.

## 2018-10-31 ENCOUNTER — MED REC SCAN ONLY (OUTPATIENT)
Dept: FAMILY MEDICINE CLINIC | Facility: CLINIC | Age: 83
End: 2018-10-31

## 2018-11-02 ENCOUNTER — TELEPHONE (OUTPATIENT)
Dept: FAMILY MEDICINE CLINIC | Facility: CLINIC | Age: 83
End: 2018-11-02

## 2018-11-02 RX ORDER — CEPHALEXIN 250 MG/1
250 CAPSULE ORAL 4 TIMES DAILY
Qty: 40 CAPSULE | Refills: 0 | Status: SHIPPED | OUTPATIENT
Start: 2018-11-02 | End: 2018-11-05

## 2018-11-02 NOTE — TELEPHONE ENCOUNTER
Patient states that the infection never really went away she doesn't think, patient states that its been 2 weeks since last abx and is wondering if she would need another one?      Patient states that her legs are still a little red but still not as bad it

## 2018-11-02 NOTE — TELEPHONE ENCOUNTER
Patient has cellulitis, would like to know if she has to make appointment with dr for him to look at his legs again.

## 2018-11-05 ENCOUNTER — OFFICE VISIT (OUTPATIENT)
Dept: FAMILY MEDICINE CLINIC | Facility: CLINIC | Age: 83
End: 2018-11-05
Payer: COMMERCIAL

## 2018-11-05 ENCOUNTER — TELEPHONE (OUTPATIENT)
Dept: FAMILY MEDICINE CLINIC | Facility: CLINIC | Age: 83
End: 2018-11-05

## 2018-11-05 VITALS
HEART RATE: 76 BPM | RESPIRATION RATE: 16 BRPM | TEMPERATURE: 97 F | BODY MASS INDEX: 32.83 KG/M2 | DIASTOLIC BLOOD PRESSURE: 64 MMHG | SYSTOLIC BLOOD PRESSURE: 124 MMHG | HEIGHT: 61.5 IN | WEIGHT: 176.13 LBS

## 2018-11-05 DIAGNOSIS — L03.119 CELLULITIS OF LOWER EXTREMITY, UNSPECIFIED LATERALITY: Primary | ICD-10-CM

## 2018-11-05 DIAGNOSIS — R60.9 EDEMA, UNSPECIFIED TYPE: ICD-10-CM

## 2018-11-05 PROCEDURE — 99214 OFFICE O/P EST MOD 30 MIN: CPT | Performed by: FAMILY MEDICINE

## 2018-11-05 RX ORDER — SULFAMETHOXAZOLE AND TRIMETHOPRIM 800; 160 MG/1; MG/1
1 TABLET ORAL 2 TIMES DAILY
Qty: 20 TABLET | Refills: 0 | Status: SHIPPED | OUTPATIENT
Start: 2018-11-05 | End: 2018-11-10

## 2018-11-05 NOTE — PROGRESS NOTES
[address]  PROGRESS NOTE  Chief Complaint:   Patient presents with:   Other: Swollen,red and itchy legs      HPI:   This is a 80year old female with history of  cellulitis in her lower extremity multiple time presents for evaluation of Disp: 60 tablet Rfl: 1   anastrozole 1 MG Oral Tab tab Take 1 mg by mouth daily. Disp:  Rfl:    Spacer/Aero-Holding Chambers (AEROCHAMBER MINI CHAMBER) Does not apply Device 1 each by Does not apply route as needed (Use with Symbicort Inhaler).  Disp: 1 Dev Estimated body mass index is 32.74 kg/m² as calculated from the following:    Height as of this encounter: 61.5\". Weight as of this encounter: 176 lb 2 oz. Vital signs reviewed.   Physical Exam:  GEN:  Patient is alert, awake and oriented, well develo notified to call with any questions, complications, allergies, or worsening or changing symptoms. Patient is to call with any side effects or complications from the treatments as a result of today.      Problem List:  Patient Active Problem List:     Cough

## 2018-11-05 NOTE — PATIENT INSTRUCTIONS
Recommend to stop other antibiotics. Start Bactrim, new antibiotics. Continue with furosemide. Recommend to switch to different stocking or not wearing stocking for 3-4 days. Recommend probiotics tablets.   Return to clinic next week if no improveme

## 2018-11-05 NOTE — TELEPHONE ENCOUNTER
Patient questions if Keflex is helping her cellulitis or not. States cellulitis really flared up yesterday. Today it is not as painful. Appt given today 1:40 with Dr Zuly Lozano for evaluation of cellulitis.   Eri Steiner, 11/05/18, 9:33 AM

## 2018-11-06 ENCOUNTER — MED REC SCAN ONLY (OUTPATIENT)
Dept: FAMILY MEDICINE CLINIC | Facility: CLINIC | Age: 83
End: 2018-11-06

## 2018-11-09 ENCOUNTER — TELEPHONE (OUTPATIENT)
Dept: FAMILY MEDICINE CLINIC | Facility: CLINIC | Age: 83
End: 2018-11-09

## 2018-11-09 NOTE — TELEPHONE ENCOUNTER
Patient is very concerned regarding her leg cullulitis. Continues to be red/painful. Started Bactrim DS on Tuesday. Would like to see Dr Mickey Hatch. Appt given Sat 11/10 11am with Dr Mickey Hatch.   Loni Lau, 11/09/18, 9:50 AM

## 2018-11-10 ENCOUNTER — OFFICE VISIT (OUTPATIENT)
Dept: FAMILY MEDICINE CLINIC | Facility: CLINIC | Age: 83
End: 2018-11-10
Payer: MEDICARE

## 2018-11-10 VITALS
SYSTOLIC BLOOD PRESSURE: 128 MMHG | RESPIRATION RATE: 20 BRPM | WEIGHT: 175.19 LBS | HEART RATE: 80 BPM | TEMPERATURE: 99 F | DIASTOLIC BLOOD PRESSURE: 68 MMHG | BODY MASS INDEX: 32.24 KG/M2 | HEIGHT: 62 IN

## 2018-11-10 DIAGNOSIS — I10 BENIGN ESSENTIAL HYPERTENSION: ICD-10-CM

## 2018-11-10 DIAGNOSIS — I80.9 SUPERFICIAL PHLEBITIS: Primary | ICD-10-CM

## 2018-11-10 PROCEDURE — 99213 OFFICE O/P EST LOW 20 MIN: CPT | Performed by: FAMILY MEDICINE

## 2018-11-10 RX ORDER — MELOXICAM 15 MG/1
15 TABLET ORAL DAILY
Qty: 15 TABLET | Refills: 1 | Status: SHIPPED | OUTPATIENT
Start: 2018-11-10 | End: 2021-03-12

## 2018-11-10 NOTE — PROGRESS NOTES
[address]  PROGRESS NOTE  Chief Complaint:   Patient presents with: Follow - Up: Cellulitis      HPI:   This is a 80year old female coming in for follow-up on the redness and pain in her legs.   She has had redness and warmth and disco Past Medical History:   Diagnosis Date   • Benign essential hypertension 5/8/2012   • Edema 5/8/2012   • History of DVT (deep vein thrombosis) 5/8/2012   • Low back pain 8/16/2016   • [facility] 6/19/2015   • Shruti Peraza Disp:  Rfl:       Counseling given: Not Answered       REVIEW OF SYSTEMS:   CONSTITUTIONAL:  Denies unusual weight gain/loss, fever, chills, or fatigue. EENT:  Eyes:  Denies eye pain, visual loss, blurred vision, double vision or yellow sclerae.  Ears, Nos There are no cords palpated in the posterior calf. NEURO:  No deficit, normal gait, strength and tone, sensory intact, normal reflexes. ASSESSMENT AND PLAN:   1.  Superficial phlebitis  She has had cellulitis in the past but this looks much more like a Age-related nuclear cataract of both eyes     Dysuria     Cellulitis of right lower extremity     Venous stasis dermatitis of both lower extremities     Cardiac arrhythmia     DVT, bilateral lower limbs (HCC)     Osteoarthrosis     Ovarian cancer ([address]

## 2018-11-10 NOTE — PATIENT INSTRUCTIONS
Stop taking Bactrim. Take Meloxicam 15 mg daily for 2 weeks; may refill it if needed. Try to avoid standing as much as possible; OK to walk around.

## 2018-11-30 NOTE — TELEPHONE ENCOUNTER
Future appt:    Last Appointment:  11/10/2018  Cholesterol, Total (mg/dL)   Date Value   06/26/2018 143     HDL Cholesterol (mg/dL)   Date Value   06/26/2018 66     LDL Cholesterol (mg/dL)   Date Value   06/26/2018 64     Triglycerides (mg/dL)   Date Value

## 2019-01-07 RX ORDER — FUROSEMIDE 20 MG/1
20 TABLET ORAL 2 TIMES DAILY
Qty: 60 TABLET | Refills: 2 | Status: SHIPPED | OUTPATIENT
Start: 2019-01-07 | End: 2019-04-02

## 2019-02-13 ENCOUNTER — OFFICE VISIT (OUTPATIENT)
Dept: FAMILY MEDICINE CLINIC | Facility: CLINIC | Age: 84
End: 2019-02-13
Payer: MEDICARE

## 2019-02-13 VITALS
SYSTOLIC BLOOD PRESSURE: 124 MMHG | DIASTOLIC BLOOD PRESSURE: 72 MMHG | BODY MASS INDEX: 32.64 KG/M2 | WEIGHT: 177.38 LBS | RESPIRATION RATE: 18 BRPM | HEART RATE: 68 BPM | HEIGHT: 62 IN | TEMPERATURE: 99 F

## 2019-02-13 DIAGNOSIS — H10.33 ACUTE BACTERIAL CONJUNCTIVITIS OF BOTH EYES: Primary | ICD-10-CM

## 2019-02-13 PROCEDURE — 99213 OFFICE O/P EST LOW 20 MIN: CPT | Performed by: FAMILY MEDICINE

## 2019-02-13 RX ORDER — TOBRAMYCIN 3 MG/ML
1 SOLUTION/ DROPS OPHTHALMIC EVERY 4 HOURS
Qty: 5 ML | Refills: 0 | Status: SHIPPED | OUTPATIENT
Start: 2019-02-13 | End: 2019-05-20 | Stop reason: ALTCHOICE

## 2019-02-13 RX ORDER — APIXABAN 2.5 MG/1
TABLET, FILM COATED ORAL
Refills: 3 | COMMUNITY
Start: 2019-01-18

## 2019-02-13 NOTE — PROGRESS NOTES
[facility] [place]  PROGRESS NOTE  Chief Complaint:   Patient presents with:  Redness: R eye, painful      HPI:   This is a 80year old female coming in for red and painful right eye. She said that she has a deficiency in 2 years normally.   She Incarcerated.    • HERNIA SURGERY  1995   • HYSTERECTOMY     • KNEE REPLACEMENT SURGERY Right 1999   • KNEE REPLACEMENT SURGERY Left 2012   • REMOVAL OF OVARIAN CYST(S)       Social History:  Social History    Tobacco Use      Smoking status: Never Smoker hearing loss, sneezing, congestion, runny nose or sore throat. INTEGUMENTARY:  Denies rashes, itching, skin lesion, or excessive skin dryness.   CARDIOVASCULAR:  Denies chest pain, chest pressure, chest discomfort, palpitations, edema, dyspnea on exertion patent, no nasal discharge Throat:  No tonsillar erythema or exudate. Mouth:  No oral lesions or ulcerations, good dentition. LUNGS: Clear to auscultation bilterally, no rales/rhonchi/wheezing. ASSESSMENT AND PLAN:   1.  Acute bacterial conjunctiviti

## 2019-04-02 RX ORDER — FUROSEMIDE 20 MG/1
TABLET ORAL
Qty: 60 TABLET | Refills: 5 | Status: SHIPPED | OUTPATIENT
Start: 2019-04-02 | End: 2020-01-21

## 2019-04-02 NOTE — TELEPHONE ENCOUNTER
Future appt:    Last Appointment:  2/13/2019  Cholesterol, Total (mg/dL)   Date Value   06/26/2018 143     HDL Cholesterol (mg/dL)   Date Value   06/26/2018 66     LDL Cholesterol (mg/dL)   Date Value   06/26/2018 64     Triglycerides (mg/dL)   Date Value

## 2019-05-17 ENCOUNTER — TELEPHONE (OUTPATIENT)
Dept: FAMILY MEDICINE CLINIC | Facility: CLINIC | Age: 84
End: 2019-05-17

## 2019-05-17 ENCOUNTER — OFFICE VISIT (OUTPATIENT)
Dept: FAMILY MEDICINE CLINIC | Facility: CLINIC | Age: 84
End: 2019-05-17
Payer: MEDICARE

## 2019-05-17 ENCOUNTER — HOSPITAL ENCOUNTER (OUTPATIENT)
Dept: GENERAL RADIOLOGY | Age: 84
Discharge: HOME | End: 2019-05-17
Attending: NURSE PRACTITIONER
Payer: MEDICARE

## 2019-05-17 VITALS
HEIGHT: 62 IN | OXYGEN SATURATION: 97 % | DIASTOLIC BLOOD PRESSURE: 70 MMHG | BODY MASS INDEX: 32.57 KG/M2 | HEART RATE: 51 BPM | TEMPERATURE: 98 F | WEIGHT: 177 LBS | SYSTOLIC BLOOD PRESSURE: 132 MMHG

## 2019-05-17 DIAGNOSIS — J20.9 BRONCHITIS WITH BRONCHOSPASM: Primary | ICD-10-CM

## 2019-05-17 DIAGNOSIS — J20.9 BRONCHITIS WITH BRONCHOSPASM: ICD-10-CM

## 2019-05-17 PROCEDURE — 71046 X-RAY EXAM CHEST 2 VIEWS: CPT | Performed by: NURSE PRACTITIONER

## 2019-05-17 PROCEDURE — 99214 OFFICE O/P EST MOD 30 MIN: CPT | Performed by: NURSE PRACTITIONER

## 2019-05-17 RX ORDER — AMOXICILLIN AND CLAVULANATE POTASSIUM 875; 125 MG/1; MG/1
1 TABLET, FILM COATED ORAL 2 TIMES DAILY
Qty: 20 TABLET | Refills: 0 | Status: SHIPPED | OUTPATIENT
Start: 2019-05-17 | End: 2019-05-27

## 2019-05-17 NOTE — TELEPHONE ENCOUNTER
Patient states she has cough/congestion and some rattling. Appt given 1:30 with Barber DARBY for evaluation today.   Nasrin Chou, 05/17/19, 1:05 PM

## 2019-05-17 NOTE — TELEPHONE ENCOUNTER
----- Message from ALAYNA Moss sent at 5/17/2019  2:57 PM CDT -----  Please notify patient that the radiologist read her chest x-ray report as chronic changes with mild bronchitis without pneumonia.   I would still recommend that she take the [facility] [place]

## 2019-05-17 NOTE — TELEPHONE ENCOUNTER
Informed pt of her xray results. Pt expressed understanding and thanks. Pt will take abx as directed.

## 2019-05-17 NOTE — PATIENT INSTRUCTIONS
Rest, increase fluids, salt water gargles ,mindy pot (use distilled water) or saline nasal spray, Robitussin- DM or  Mucinex-DM,Tylenol/Ibuprofen, follow up if symptoms persist or increase.

## 2019-05-17 NOTE — PROGRESS NOTES
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 Patient voiced understanding and is in agreement with treatment plan. Follow up if symptoms do not improve or if symptoms worsen. Risk and benefits of medication discussed.   Serge Vizcarra

## 2019-05-20 ENCOUNTER — TELEPHONE (OUTPATIENT)
Dept: FAMILY MEDICINE CLINIC | Facility: CLINIC | Age: 84
End: 2019-05-20

## 2019-05-20 ENCOUNTER — OFFICE VISIT (OUTPATIENT)
Dept: FAMILY MEDICINE CLINIC | Facility: CLINIC | Age: 84
End: 2019-05-20
Payer: MEDICARE

## 2019-05-20 VITALS
BODY MASS INDEX: 32.79 KG/M2 | DIASTOLIC BLOOD PRESSURE: 70 MMHG | SYSTOLIC BLOOD PRESSURE: 142 MMHG | HEART RATE: 68 BPM | TEMPERATURE: 98 F | RESPIRATION RATE: 20 BRPM | WEIGHT: 178.19 LBS | HEIGHT: 62 IN

## 2019-05-20 DIAGNOSIS — J44.1 ACUTE EXACERBATION OF CHRONIC OBSTRUCTIVE PULMONARY DISEASE (COPD) (HCC): Primary | ICD-10-CM

## 2019-05-20 DIAGNOSIS — I80.9 SUPERFICIAL PHLEBITIS: ICD-10-CM

## 2019-05-20 DIAGNOSIS — J30.1 CHRONIC SEASONAL ALLERGIC RHINITIS DUE TO POLLEN: ICD-10-CM

## 2019-05-20 PROCEDURE — 99213 OFFICE O/P EST LOW 20 MIN: CPT | Performed by: FAMILY MEDICINE

## 2019-05-20 RX ORDER — BENZONATATE 100 MG/1
100 CAPSULE ORAL 3 TIMES DAILY PRN
Qty: 30 CAPSULE | Refills: 1 | Status: SHIPPED | OUTPATIENT
Start: 2019-05-20 | End: 2019-12-11

## 2019-05-20 NOTE — PROGRESS NOTES
[address]  PROGRESS NOTE  Chief Complaint:   Patient presents with: Follow - Up: Cough  Leg Pain      HPI:   This is a 80year old female coming in for follow-up on her cough. She was seen in the office on May 17.   She was started on lymphatic vessel 8/17/2012   • Ovarian cancer ([facility] [address] 1994     Past Surgical History:   Procedure Laterality Date   • CHOLECYSTECTOMY  1997   • HERNIA SURGERY  1998    Incarcerated.    • HERNIA SURGERY  1995   • HYSTERECTOMY     • KNEE REPLACEMENT SURGERY Rig Inhaler). Disp: 1 Device Rfl: 0      Counseling given: Not Answered       REVIEW OF SYSTEMS:   CONSTITUTIONAL: She does not have a fever. She is slowly feeling better. She has a little more energy today.   EENT:  Eyes:  Denies eye pain, visual loss, blurr bilaterally, no eye discharge Ears: External normal. Nose: patent, no nasal discharge Throat:  No tonsillar erythema or exudate. Mouth:  No oral lesions or ulcerations, good dentition. NECK: Supple, no CLAD, no JVD, no thyromegaly.   SKIN: No rashes, no s Outcome: Patient verbalizes understanding. Patient is notified to call with any questions, complications, allergies, or worsening or changing symptoms. Patient is to call with any side effects or complications from the treatments as a result of today.

## 2019-05-20 NOTE — TELEPHONE ENCOUNTER
Patient states cough is better, but still sob persists. States phlebitis in legs is bothersome. Appt given 1:15 with Dr. Heike Smyth for evaluation.   Melissa Ulrich, 05/20/19, 9:22 AM

## 2019-08-09 ENCOUNTER — OFFICE VISIT (OUTPATIENT)
Dept: FAMILY MEDICINE CLINIC | Facility: CLINIC | Age: 84
End: 2019-08-09
Payer: MEDICARE

## 2019-08-09 ENCOUNTER — APPOINTMENT (OUTPATIENT)
Dept: LAB | Age: 84
End: 2019-08-09
Attending: FAMILY MEDICINE
Payer: MEDICARE

## 2019-08-09 VITALS
WEIGHT: 177.38 LBS | SYSTOLIC BLOOD PRESSURE: 152 MMHG | HEIGHT: 62 IN | RESPIRATION RATE: 18 BRPM | BODY MASS INDEX: 32.64 KG/M2 | TEMPERATURE: 97 F | DIASTOLIC BLOOD PRESSURE: 88 MMHG | HEART RATE: 68 BPM

## 2019-08-09 DIAGNOSIS — I10 BENIGN ESSENTIAL HYPERTENSION: ICD-10-CM

## 2019-08-09 DIAGNOSIS — Z86.718 HISTORY OF DVT (DEEP VEIN THROMBOSIS): ICD-10-CM

## 2019-08-09 DIAGNOSIS — M54.42 CHRONIC MIDLINE LOW BACK PAIN WITH BILATERAL SCIATICA: ICD-10-CM

## 2019-08-09 DIAGNOSIS — I87.2 VENOUS STASIS DERMATITIS OF BOTH LOWER EXTREMITIES: ICD-10-CM

## 2019-08-09 DIAGNOSIS — L03.119 RECURRENT CELLULITIS OF LOWER EXTREMITY: ICD-10-CM

## 2019-08-09 DIAGNOSIS — I51.89 DIASTOLIC DYSFUNCTION: ICD-10-CM

## 2019-08-09 DIAGNOSIS — Z00.00 ENCOUNTER FOR ANNUAL HEALTH EXAMINATION: Primary | ICD-10-CM

## 2019-08-09 DIAGNOSIS — Z13.6 SCREENING FOR CARDIOVASCULAR CONDITION: ICD-10-CM

## 2019-08-09 DIAGNOSIS — J44.1 ACUTE EXACERBATION OF CHRONIC OBSTRUCTIVE PULMONARY DISEASE (COPD) (HCC): ICD-10-CM

## 2019-08-09 DIAGNOSIS — Z98.41 HISTORY OF BILATERAL CATARACT EXTRACTION: ICD-10-CM

## 2019-08-09 DIAGNOSIS — G89.29 CHRONIC MIDLINE LOW BACK PAIN WITH BILATERAL SCIATICA: ICD-10-CM

## 2019-08-09 DIAGNOSIS — M15.9 PRIMARY OSTEOARTHRITIS INVOLVING MULTIPLE JOINTS: ICD-10-CM

## 2019-08-09 DIAGNOSIS — J30.1 CHRONIC SEASONAL ALLERGIC RHINITIS DUE TO POLLEN: ICD-10-CM

## 2019-08-09 DIAGNOSIS — Z98.42 HISTORY OF BILATERAL CATARACT EXTRACTION: ICD-10-CM

## 2019-08-09 DIAGNOSIS — C56.9 MALIGNANT NEOPLASM OF OVARY, UNSPECIFIED LATERALITY (HCC): ICD-10-CM

## 2019-08-09 DIAGNOSIS — Z23 NEED FOR VACCINATION: ICD-10-CM

## 2019-08-09 DIAGNOSIS — M54.41 CHRONIC MIDLINE LOW BACK PAIN WITH BILATERAL SCIATICA: ICD-10-CM

## 2019-08-09 PROBLEM — I82.403 DVT, BILATERAL LOWER LIMBS (HCC): Status: RESOLVED | Noted: 2018-09-21 | Resolved: 2019-08-09

## 2019-08-09 PROBLEM — H25.13 AGE-RELATED NUCLEAR CATARACT OF BOTH EYES: Status: RESOLVED | Noted: 2017-11-28 | Resolved: 2019-08-09

## 2019-08-09 PROBLEM — J40 BRONCHITIS: Status: RESOLVED | Noted: 2017-08-29 | Resolved: 2019-08-09

## 2019-08-09 PROBLEM — H10.33 ACUTE BACTERIAL CONJUNCTIVITIS OF BOTH EYES: Status: RESOLVED | Noted: 2019-02-13 | Resolved: 2019-08-09

## 2019-08-09 LAB
ALBUMIN SERPL-MCNC: 3.4 G/DL (ref 3.4–5)
ALBUMIN/GLOB SERPL: 0.9 {RATIO} (ref 1–2)
ALP LIVER SERPL-CCNC: 92 U/L (ref 55–142)
ALT SERPL-CCNC: 27 U/L (ref 13–56)
ANION GAP SERPL CALC-SCNC: 6 MMOL/L (ref 0–18)
AST SERPL-CCNC: 24 U/L (ref 15–37)
BASOPHILS # BLD AUTO: 0.04 X10(3) UL (ref 0–0.2)
BASOPHILS NFR BLD AUTO: 0.5 %
BILIRUB SERPL-MCNC: 0.4 MG/DL (ref 0.1–2)
BUN BLD-MCNC: 24 MG/DL (ref 7–18)
BUN/CREAT SERPL: 21.1 (ref 10–20)
CALCIUM BLD-MCNC: 8.7 MG/DL (ref 8.5–10.1)
CHLORIDE SERPL-SCNC: 106 MMOL/L (ref 98–112)
CHOLEST SMN-MCNC: 142 MG/DL (ref ?–200)
CO2 SERPL-SCNC: 28 MMOL/L (ref 21–32)
CREAT BLD-MCNC: 1.14 MG/DL (ref 0.55–1.02)
DEPRECATED RDW RBC AUTO: 47.5 FL (ref 35.1–46.3)
EOSINOPHIL # BLD AUTO: 0.16 X10(3) UL (ref 0–0.7)
EOSINOPHIL NFR BLD AUTO: 1.8 %
ERYTHROCYTE [DISTWIDTH] IN BLOOD BY AUTOMATED COUNT: 13.5 % (ref 11–15)
GLOBULIN PLAS-MCNC: 4 G/DL (ref 2.8–4.4)
GLUCOSE BLD-MCNC: 98 MG/DL (ref 70–99)
HCT VFR BLD AUTO: 41.9 % (ref 35–48)
HDLC SERPL-MCNC: 61 MG/DL (ref 40–59)
HGB BLD-MCNC: 13.6 G/DL (ref 12–16)
IMM GRANULOCYTES # BLD AUTO: 0.03 X10(3) UL (ref 0–1)
IMM GRANULOCYTES NFR BLD: 0.3 %
LDLC SERPL CALC-MCNC: 65 MG/DL (ref ?–100)
LYMPHOCYTES # BLD AUTO: 2.03 X10(3) UL (ref 1–4)
LYMPHOCYTES NFR BLD AUTO: 22.9 %
M PROTEIN MFR SERPL ELPH: 7.4 G/DL (ref 6.4–8.2)
MCH RBC QN AUTO: 30.8 PG (ref 26–34)
MCHC RBC AUTO-ENTMCNC: 32.5 G/DL (ref 31–37)
MCV RBC AUTO: 95 FL (ref 80–100)
MONOCYTES # BLD AUTO: 0.65 X10(3) UL (ref 0.1–1)
MONOCYTES NFR BLD AUTO: 7.3 %
NEUTROPHILS # BLD AUTO: 5.96 X10 (3) UL (ref 1.5–7.7)
NEUTROPHILS # BLD AUTO: 5.96 X10(3) UL (ref 1.5–7.7)
NEUTROPHILS NFR BLD AUTO: 67.2 %
NONHDLC SERPL-MCNC: 81 MG/DL (ref ?–130)
OSMOLALITY SERPL CALC.SUM OF ELEC: 294 MOSM/KG (ref 275–295)
PLATELET # BLD AUTO: 215 10(3)UL (ref 150–450)
POTASSIUM SERPL-SCNC: 4 MMOL/L (ref 3.5–5.1)
RBC # BLD AUTO: 4.41 X10(6)UL (ref 3.8–5.3)
SODIUM SERPL-SCNC: 140 MMOL/L (ref 136–145)
TRIGL SERPL-MCNC: 81 MG/DL (ref 30–149)
TSI SER-ACNC: 1.29 MIU/ML (ref 0.36–3.74)
VLDLC SERPL CALC-MCNC: 16 MG/DL (ref 0–30)
WBC # BLD AUTO: 8.9 X10(3) UL (ref 4–11)

## 2019-08-09 PROCEDURE — 85025 COMPLETE CBC W/AUTO DIFF WBC: CPT | Performed by: FAMILY MEDICINE

## 2019-08-09 PROCEDURE — 36415 COLL VENOUS BLD VENIPUNCTURE: CPT | Performed by: FAMILY MEDICINE

## 2019-08-09 PROCEDURE — 80061 LIPID PANEL: CPT | Performed by: FAMILY MEDICINE

## 2019-08-09 PROCEDURE — 80053 COMPREHEN METABOLIC PANEL: CPT | Performed by: FAMILY MEDICINE

## 2019-08-09 PROCEDURE — G0439 PPPS, SUBSEQ VISIT: HCPCS | Performed by: FAMILY MEDICINE

## 2019-08-09 PROCEDURE — 84443 ASSAY THYROID STIM HORMONE: CPT | Performed by: FAMILY MEDICINE

## 2019-08-09 PROCEDURE — 90715 TDAP VACCINE 7 YRS/> IM: CPT | Performed by: FAMILY MEDICINE

## 2019-08-09 PROCEDURE — 90670 PCV13 VACCINE IM: CPT | Performed by: FAMILY MEDICINE

## 2019-08-09 PROCEDURE — 90472 IMMUNIZATION ADMIN EACH ADD: CPT | Performed by: FAMILY MEDICINE

## 2019-08-09 PROCEDURE — 99214 OFFICE O/P EST MOD 30 MIN: CPT | Performed by: FAMILY MEDICINE

## 2019-08-09 PROCEDURE — G0009 ADMIN PNEUMOCOCCAL VACCINE: HCPCS | Performed by: FAMILY MEDICINE

## 2019-08-09 RX ORDER — GABAPENTIN 100 MG/1
100 CAPSULE ORAL NIGHTLY
Qty: 90 CAPSULE | Refills: 1 | Status: SHIPPED | OUTPATIENT
Start: 2019-08-09 | End: 2020-01-03

## 2019-08-09 NOTE — PROGRESS NOTES
[facility] [place]  PROGRESS NOTE  Chief Complaint:   Patient presents with:  Physical      HPI:   This is a 80year old female coming in for her annual Medicare wellness visit. She has chronic low back pain.   She has scoliosis and pain in he Eosinophil Absolute 0.16 0.00 - 0.70 x10(3) uL    Basophil Absolute 0.04 0.00 - 0.20 x10(3) uL    Immature Granulocyte Absolute 0.03 0.00 - 1.00 x10(3) uL    Neutrophil % 67.2 %    Lymphocyte % 22.9 %    Monocyte % 7.3 %    Eosinophil % 1.8 %    Basophil % Potassium Chloride ER 10 MEQ Oral Tab CR Take 1 tablet (10 mEq total) by mouth daily.  Disp: 90 tablet Rfl: 3   BISOPROLOL FUMARATE 5 MG Oral Tab TAKE ONE TABLET BY MOUTH EVERY DAY Disp: 90 tablet Rfl: 3   SYMBICORT 160-4.5 MCG/ACT Inhalation Aerosol INHA Patient Position: Sitting, Cuff Size: large)   Pulse 68   Temp 97.3 °F (36.3 °C) (Tympanic)   Resp 18   Ht 62\"   Wt 177 lb 6 oz   BMI 32.44 kg/m²  Estimated body mass index is 32.44 kg/m² as calculated from the following:    Height as of this encounter: 6 treatment.  - OFFICE/OUTPT VISIT,EST,LEVL IV  - COMP METABOLIC PANEL (14); Future  - TSH W REFLEX TO FREE T4; Future  - CBC WITH DIFFERENTIAL WITH PLATELET;  Future  - PAIN MANAGEMENT - EXTERNAL  - COMP METABOLIC PANEL (14)  - TSH W REFLEX TO FREE T4  - CBC COMP METABOLIC PANEL (14)  - TSH W REFLEX TO FREE T4  - CBC WITH DIFFERENTIAL WITH PLATELET  - CBC W/ DIFFERENTIAL    7. History of DVT (deep vein thrombosis)  She has a past history of a DVT.   She does not have any signs or symptoms of that now.  - OFFICE FREE T4; Future  - CBC WITH DIFFERENTIAL WITH PLATELET; Future  - COMP METABOLIC PANEL (14)  - TSH W REFLEX TO FREE T4  - CBC WITH DIFFERENTIAL WITH PLATELET  - CBC W/ DIFFERENTIAL    12.  Recurrent cellulitis of lower extremity  She has recurring celluliti examination     Dysuria     Cellulitis of right lower extremity     Venous stasis dermatitis of both lower extremities     Cardiac arrhythmia     Osteoarthrosis     Ovarian cancer ([address]     Heterozygous factor V Leiden mutation (HCC)     Superficial phlebi

## 2019-08-09 NOTE — PATIENT INSTRUCTIONS
Recommended Websites for Advanced Directives    [email or web address]. org/publications/Documents/personal_dec. pdf  An information packet, including necessary form from the [address] website. http://www. [place]state. [place]/public/books/adv

## 2019-08-13 ENCOUNTER — TELEPHONE (OUTPATIENT)
Dept: FAMILY MEDICINE CLINIC | Facility: CLINIC | Age: 84
End: 2019-08-13

## 2019-08-13 NOTE — TELEPHONE ENCOUNTER
----- Message from Kelsey Braswell MD sent at 8/12/2019  5:05 PM CDT -----  Please call [employer]. Her cholesterol is normal at 142. Her blood sugar is normal.   Her kidney function has changed slightly and is now a little worse than before.   Her thyroid i

## 2019-09-09 RX ORDER — BISOPROLOL FUMARATE 5 MG/1
TABLET ORAL
Qty: 90 TABLET | Refills: 0 | Status: SHIPPED | OUTPATIENT
Start: 2019-09-09 | End: 2019-12-05

## 2019-09-09 NOTE — TELEPHONE ENCOUNTER
Future appt:     Last Appointment with provider:   8/9/2019; Return in 3 months (on 11/9/2019).      Last appointment at [facility] [place]:  8/9/2019  Cholesterol, Total (mg/dL)   Date Value   08/09/2019 142     HDL Cholesterol (mg/dL)   Date Value   08/09/2019

## 2019-09-19 NOTE — TELEPHONE ENCOUNTER
Future appt:     Last Appointment with provider:   8/9/2019;  Return in 3 months (on 11/9/2019).      Last appointment at [facility] [place]:  8/9/2019  Cholesterol, Total (mg/dL)   Date Value   08/09/2019 142     HDL Cholesterol (mg/dL)   Date Value   08/09/2019

## 2019-09-20 RX ORDER — POTASSIUM CHLORIDE 750 MG/1
TABLET, EXTENDED RELEASE ORAL
Qty: 90 TABLET | Refills: 1 | Status: SHIPPED | OUTPATIENT
Start: 2019-09-20 | End: 2020-03-20

## 2019-10-03 RX ORDER — BUDESONIDE AND FORMOTEROL FUMARATE DIHYDRATE 160; 4.5 UG/1; UG/1
AEROSOL RESPIRATORY (INHALATION)
Qty: 1 INHALER | Refills: 0 | Status: SHIPPED | OUTPATIENT
Start: 2019-10-03 | End: 2020-01-07

## 2019-10-03 NOTE — TELEPHONE ENCOUNTER
Future appt:     Last Appointment with provider:   8/9/2019;Return in 3 months (on 11/9/2019).       Last appointment at [facility] [place]:  8/9/2019  Cholesterol, Total (mg/dL)   Date Value   08/09/2019 142     HDL Cholesterol (mg/dL)   Date Value   08/09/2019

## 2019-11-21 ENCOUNTER — TELEPHONE (OUTPATIENT)
Dept: FAMILY MEDICINE CLINIC | Facility: CLINIC | Age: 84
End: 2019-11-21

## 2019-11-21 NOTE — TELEPHONE ENCOUNTER
OTW- pt was seen in ER yesterday for UTI- was told to follow up with Dr Marry Magaña, nothing avail until 1/3/19-appt was scheduled but she would like to be seen sooner and  wants to see Dr Marry Magaña only- is aware  and nurse/ MA will be in tomorrow to addres

## 2019-11-22 NOTE — TELEPHONE ENCOUNTER
[facility] with [facility]TI. Antibiotic to be finished up tomorrow. Appt given with James for [facility] ER and recheck of urine. Get Macias, 11/22/19, 2:52 PM    Future appt:     Your appointments     Date & Time Appointment Department [facility])    Nov

## 2019-11-26 ENCOUNTER — OFFICE VISIT (OUTPATIENT)
Dept: FAMILY MEDICINE CLINIC | Facility: CLINIC | Age: 84
End: 2019-11-26
Payer: MEDICARE

## 2019-11-26 VITALS
SYSTOLIC BLOOD PRESSURE: 126 MMHG | DIASTOLIC BLOOD PRESSURE: 78 MMHG | BODY MASS INDEX: 31.03 KG/M2 | HEART RATE: 76 BPM | OXYGEN SATURATION: 97 % | WEIGHT: 168.63 LBS | RESPIRATION RATE: 16 BRPM | HEIGHT: 62 IN | TEMPERATURE: 99 F

## 2019-11-26 DIAGNOSIS — R35.0 URINARY FREQUENCY: Primary | ICD-10-CM

## 2019-11-26 DIAGNOSIS — Z09 NEED FOR FOLLOW-UP CARE AFTER DISCHARGE: ICD-10-CM

## 2019-11-26 PROCEDURE — 81003 URINALYSIS AUTO W/O SCOPE: CPT | Performed by: NURSE PRACTITIONER

## 2019-11-26 PROCEDURE — 87086 URINE CULTURE/COLONY COUNT: CPT | Performed by: NURSE PRACTITIONER

## 2019-11-26 PROCEDURE — 81001 URINALYSIS AUTO W/SCOPE: CPT | Performed by: NURSE PRACTITIONER

## 2019-11-26 PROCEDURE — 87186 SC STD MICRODIL/AGAR DIL: CPT | Performed by: NURSE PRACTITIONER

## 2019-11-26 PROCEDURE — 87077 CULTURE AEROBIC IDENTIFY: CPT | Performed by: NURSE PRACTITIONER

## 2019-11-26 PROCEDURE — 99213 OFFICE O/P EST LOW 20 MIN: CPT | Performed by: NURSE PRACTITIONER

## 2019-11-26 RX ORDER — CIPROFLOXACIN 250 MG/1
250 TABLET, FILM COATED ORAL 2 TIMES DAILY
Qty: 10 TABLET | Refills: 0 | Status: SHIPPED | OUTPATIENT
Start: 2019-11-26 | End: 2019-12-01

## 2019-11-26 NOTE — PROGRESS NOTES
HPI:    Patient ID: Mary Velázquez is a 80year old female. Patient reports to the clinic today for ER follow up. Patient was at [facility] Sunday when she bean to feel ill. Reports she had pain all over the body, and just did not feel well.  She was (AEROCHAMBER MINI CHAMBER) Does not apply Device 1 each by Does not apply route as needed (Use with Symbicort Inhaler). 1 Device 0   • ARTIFICIAL TEAR OP Apply 1 drop to eye daily.      • Multiple Vitamin (MULTIVITAMINS) Oral Cap Take 1 capsule by mouth hanh

## 2019-11-26 NOTE — PATIENT INSTRUCTIONS
UA done at visit still showing signs of infection    Will extend antibiotic treatment     Drink plenty of water    NO baths or submerging in water     Continue to monitor symptoms.      Understanding Urinary Tract Infections (UTIs)  Most UTIs are caused by

## 2019-11-29 ENCOUNTER — TELEPHONE (OUTPATIENT)
Dept: FAMILY MEDICINE CLINIC | Facility: CLINIC | Age: 84
End: 2019-11-29

## 2019-11-29 NOTE — TELEPHONE ENCOUNTER
----- Message from ALAYNA Lazo sent at 11/29/2019 10:31 AM CST -----  Results reviewed. Urine culture consistent with UTI. Recommend finishing entire course of antibiotic.   Please inform patient, Thank you     patricio

## 2019-12-05 RX ORDER — BISOPROLOL FUMARATE 5 MG/1
TABLET ORAL
Qty: 90 TABLET | Refills: 0 | Status: SHIPPED | OUTPATIENT
Start: 2019-12-05 | End: 2020-02-18

## 2019-12-05 NOTE — TELEPHONE ENCOUNTER
Future appt:     Your appointments     Date & Time Appointment Department [facility])    Jan 03, 2020 11:00 AM CST Exam - Established with Hansel Leventhal, MD [address] [place] [address]

## 2019-12-11 ENCOUNTER — TELEPHONE (OUTPATIENT)
Dept: FAMILY MEDICINE CLINIC | Facility: CLINIC | Age: 84
End: 2019-12-11

## 2019-12-11 ENCOUNTER — APPOINTMENT (OUTPATIENT)
Dept: LAB | Age: 84
End: 2019-12-11
Attending: FAMILY MEDICINE
Payer: MEDICARE

## 2019-12-11 ENCOUNTER — OFFICE VISIT (OUTPATIENT)
Dept: FAMILY MEDICINE CLINIC | Facility: CLINIC | Age: 84
End: 2019-12-11
Payer: MEDICARE

## 2019-12-11 VITALS
WEIGHT: 160 LBS | SYSTOLIC BLOOD PRESSURE: 130 MMHG | RESPIRATION RATE: 16 BRPM | HEART RATE: 87 BPM | OXYGEN SATURATION: 98 % | DIASTOLIC BLOOD PRESSURE: 72 MMHG | BODY MASS INDEX: 29 KG/M2 | TEMPERATURE: 99 F

## 2019-12-11 DIAGNOSIS — R35.0 URINARY FREQUENCY: ICD-10-CM

## 2019-12-11 DIAGNOSIS — F41.9 ANXIETY: ICD-10-CM

## 2019-12-11 DIAGNOSIS — R53.1 WEAKNESS: Primary | ICD-10-CM

## 2019-12-11 DIAGNOSIS — D68.51 HETEROZYGOUS FACTOR V LEIDEN MUTATION (HCC): ICD-10-CM

## 2019-12-11 PROCEDURE — 99214 OFFICE O/P EST MOD 30 MIN: CPT | Performed by: FAMILY MEDICINE

## 2019-12-11 PROCEDURE — 81003 URINALYSIS AUTO W/O SCOPE: CPT | Performed by: FAMILY MEDICINE

## 2019-12-11 PROCEDURE — 36415 COLL VENOUS BLD VENIPUNCTURE: CPT | Performed by: FAMILY MEDICINE

## 2019-12-11 PROCEDURE — 85025 COMPLETE CBC W/AUTO DIFF WBC: CPT | Performed by: FAMILY MEDICINE

## 2019-12-11 PROCEDURE — 80053 COMPREHEN METABOLIC PANEL: CPT | Performed by: FAMILY MEDICINE

## 2019-12-11 PROCEDURE — 84443 ASSAY THYROID STIM HORMONE: CPT | Performed by: FAMILY MEDICINE

## 2019-12-11 RX ORDER — ESCITALOPRAM OXALATE 5 MG/1
5 TABLET ORAL DAILY
Qty: 30 TABLET | Refills: 5 | Status: SHIPPED | OUTPATIENT
Start: 2019-12-11 | End: 2020-05-29

## 2019-12-11 RX ORDER — LOTEPREDNOL ETABONATE 5 MG/ML
1 SUSPENSION/ DROPS OPHTHALMIC EVERY 6 HOURS
Refills: 0 | COMMUNITY
Start: 2019-12-10

## 2019-12-11 NOTE — TELEPHONE ENCOUNTER
Patient was here for an appt today with Dr. Janna Flowers. Antibiotic completed.   Repeat U/A was normal.

## 2019-12-11 NOTE — TELEPHONE ENCOUNTER
Future Appointments   Date Time Provider Ina Briscoe   1/3/2020 11:00 AM Ghanshyam Davalos MD [facility] [place] [facility]

## 2019-12-12 NOTE — TELEPHONE ENCOUNTER
----- Message from Camilla Palomino MD sent at 12/11/2019  6:23 PM CST -----  Please call Eric Etienne. Her blood work looked pretty good overall.   Her blood sugar was slightly elevated at 125 but this was not a fasting blood sample so that blood sugar level is

## 2019-12-12 NOTE — PROGRESS NOTES
[address]  PROGRESS NOTE  Chief Complaint:   Patient presents with:  Stomach Pain: \"Just not feeling well. \"  Feeling shakey. Weakness  Fatigue  Nerves      HPI:   This is a 80year old female coming in for just not feeling good.   She HYSTERECTOMY     • KNEE REPLACEMENT SURGERY Right 1999   • KNEE REPLACEMENT SURGERY Left 2012   • REMOVAL OF OVARIAN CYST(S)       Social History:  Social History    Tobacco Use      Smoking status: Never Smoker      Smokeless tobacco: Never Used    Alcoho Denies hearing loss, sneezing, congestion, runny nose or sore throat. INTEGUMENTARY:  Denies rashes, itching, skin lesion, or excessive skin dryness.   CARDIOVASCULAR:  Denies chest pain, chest pressure, chest discomfort, palpitations, edema, dyspnea on e no murmurs, rubs or gallops. LUNGS: Clear to auscultation bilterally, no rales/rhonchi/wheezing. ABDOMEN:  Soft, nondistended, nontender, bowel sounds normal in all 4 quadrants, no masses, no hepatosplenomegaly.   BACK: No tenderness, no spasm, SLR test n Prescriptions Disp Refills   • escitalopram 5 MG Oral Tab 30 tablet 5     Sig: Take 1 tablet (5 mg total) by mouth daily.        Health Maintenance:  DEXA Scan due on 03/11/1999    Patient/Caregiver Education: Patient/Caregiver Education: There are no Yoni Nipper

## 2020-01-03 ENCOUNTER — OFFICE VISIT (OUTPATIENT)
Dept: FAMILY MEDICINE CLINIC | Facility: CLINIC | Age: 85
End: 2020-01-03
Payer: MEDICARE

## 2020-01-03 VITALS
HEART RATE: 66 BPM | DIASTOLIC BLOOD PRESSURE: 80 MMHG | HEIGHT: 62 IN | BODY MASS INDEX: 31.65 KG/M2 | WEIGHT: 172 LBS | SYSTOLIC BLOOD PRESSURE: 146 MMHG | RESPIRATION RATE: 20 BRPM | TEMPERATURE: 98 F | OXYGEN SATURATION: 97 %

## 2020-01-03 DIAGNOSIS — F32.0 CURRENT MILD EPISODE OF MAJOR DEPRESSIVE DISORDER WITHOUT PRIOR EPISODE (HCC): ICD-10-CM

## 2020-01-03 DIAGNOSIS — F41.9 ANXIETY: ICD-10-CM

## 2020-01-03 DIAGNOSIS — R30.0 DYSURIA: Primary | ICD-10-CM

## 2020-01-03 LAB
APPEARANCE: CLEAR
MULTISTIX LOT#: NORMAL NUMERIC
PH, URINE: 7 (ref 4.5–8)
SPECIFIC GRAVITY: 1.02 (ref 1–1.03)
URINE-COLOR: YELLOW
UROBILINOGEN,SEMI-QN: 0.2 MG/DL (ref 0–1.9)

## 2020-01-03 PROCEDURE — 99213 OFFICE O/P EST LOW 20 MIN: CPT | Performed by: FAMILY MEDICINE

## 2020-01-03 PROCEDURE — 81003 URINALYSIS AUTO W/O SCOPE: CPT | Performed by: FAMILY MEDICINE

## 2020-01-03 NOTE — PROGRESS NOTES
[address]  PROGRESS NOTE  Chief Complaint:   Patient presents with: Anxiety  Urinary Frequency: some days worse than others, ususally worse at night      HPI:   This is a 80year old female coming in for follow-up on her anxiety.   She of lymphatic vessel 8/17/2012   • Ovarian cancer ([facility] [address] 1994     Past Surgical History:   Procedure Laterality Date   • CHOLECYSTECTOMY  1997   • HERNIA SURGERY  1998    Incarcerated.    • HERNIA SURGERY  1995   • HYSTERECTOMY     • KNEE REPLACEMENT SURGERY REVIEW OF SYSTEMS:   CONSTITUTIONAL: She actually is sleeping a little bit better and so she feels like her energy level is better now. She is able to focus a little more and actually accomplish some tasks.   EENT:  Eyes:  Denies eye pain, visual loss, anxious than before. She was able to climb on the exam table with assistance.   HEENT:  Head:  Normocephalic, atraumatic Eyes: EOMI, PERRLA, no scleral icterus, conjunctivae clear bilaterally, no eye discharge Ears: External normal. Nose: patent, no nasal complications from the treatments as a result of today.      Problem List:  Patient Active Problem List:     Cough     History of DVT (deep vein thrombosis)     Edema     Screening examination for other specified bacterial and spirochetal diseases     Megan

## 2020-01-07 RX ORDER — BUDESONIDE AND FORMOTEROL FUMARATE DIHYDRATE 160; 4.5 UG/1; UG/1
AEROSOL RESPIRATORY (INHALATION)
Qty: 3 INHALER | Refills: 1 | Status: SHIPPED | OUTPATIENT
Start: 2020-01-07 | End: 2021-03-10

## 2020-01-07 NOTE — TELEPHONE ENCOUNTER
Future appt:    Last Appointment with provider:   1/3/2020  [place] [facility] [place]:  1/3/2020  Cholesterol, Total (mg/dL)   Date Value   08/09/2019 142     HDL Cholesterol (mg/dL)   Date Value   08/09/2019 61 (H)     LDL Cholesterol (mg/dL)   Darrick

## 2020-01-21 RX ORDER — FUROSEMIDE 20 MG/1
TABLET ORAL
Qty: 180 TABLET | Refills: 1 | Status: SHIPPED | OUTPATIENT
Start: 2020-01-21 | End: 2020-09-01

## 2020-01-21 NOTE — TELEPHONE ENCOUNTER
Future appt:    Last Appointment with provider:   1/3/2020  Last appointment at [facility] [place]:  1/3/2020  Cholesterol, Total (mg/dL)   Date Value   08/09/2019 142     HDL Cholesterol (mg/dL)   Date Value   08/09/2019 61 (H)     LDL Cholesterol (mg/dL)   Darrick

## 2020-02-11 NOTE — PROGRESS NOTES
[facility] [place]  PROGRESS NOTE  Chief Complaint:   Patient presents with:  Medication Follow-Up      HPI:   This is a 80year old female coming in for follow-up of her medication. She has been taking the escitalopram every day.   She does fee Smoking status: Never Smoker      Smokeless tobacco: Never Used    Alcohol use: No    Drug use: No    Family History:  History reviewed. No pertinent family history.   Allergies:    Erythromycin            HIVES  Current Meds:  Current Outpatient Medication palpitations, edema, dyspnea on exertion or at rest.  RESPIRATORY:  Denies shortness of breath, wheezing, cough or sputum. GASTROINTESTINAL:  Denies abdominal pain, nausea, vomiting, constipation, diarrhea, or blood in stool.   MUSCULOSKELETAL:  Denies wea strength. She does not show any signs of pill-rolling or of cogwheel rigidity. ASSESSMENT AND PLAN:   1.  Current mild episode of major depressive disorder without prior episode ([address]  Her depression is definitely better with the low-dose escitalopram. disease (COPD) ([facility] [address]     History of bilateral cataract extraction     Recurrent cellulitis of lower extremity     Prolapse of vaginal wall     Anxiety     Urinary frequency     Current mild episode of major depressive disorder without prior episode ([facility] [address]

## 2020-02-18 RX ORDER — BISOPROLOL FUMARATE 5 MG/1
TABLET ORAL
Qty: 90 TABLET | Refills: 1 | Status: SHIPPED | OUTPATIENT
Start: 2020-02-18 | End: 2020-08-25

## 2020-02-18 NOTE — TELEPHONE ENCOUNTER
Future appt:    Last Appointment with provider:   2/11/2020  [place] [facility] [place]:  2/11/2020  Cholesterol, Total (mg/dL)   Date Value   08/09/2019 142     HDL Cholesterol (mg/dL)   Date Value   08/09/2019 61 (H)     LDL Cholesterol (mg/dL)   D

## 2020-03-20 RX ORDER — POTASSIUM CHLORIDE 750 MG/1
TABLET, EXTENDED RELEASE ORAL
Qty: 90 TABLET | Refills: 1 | Status: SHIPPED | OUTPATIENT
Start: 2020-03-20 | End: 2020-09-01

## 2020-03-20 NOTE — TELEPHONE ENCOUNTER
Future appt:     Last Appointment with provider:   2/11/2020; Return in about 3 months (around 5/11/2020).      Last appointment at [facility] [place]:  2/11/2020  Cholesterol, Total (mg/dL)   Date Value   08/09/2019 142     HDL Cholesterol (mg/dL)   Date Value

## 2020-05-15 ENCOUNTER — TELEPHONE (OUTPATIENT)
Dept: FAMILY MEDICINE CLINIC | Facility: CLINIC | Age: 85
End: 2020-05-15

## 2020-05-15 NOTE — TELEPHONE ENCOUNTER
Patient was last seen by Dr. Yessenia Mari on 2.11.2020 for depression/med check regarding escitalopram. Patient was instructed to recheck in 3 months.      Please advise if patient can be scheduled for in office follow up regarding escitalopram.

## 2020-05-29 NOTE — PROGRESS NOTES
[address]  PROGRESS NOTE  Chief Complaint:   Patient presents with: Follow - Up      HPI:   This is a 80year old female coming in for follow-up on her depression. She said that overall she is feeling better.   She is far less anxiou SURGERY  1998    Incarcerated.    • HERNIA SURGERY  1995   • HYSTERECTOMY     • KNEE REPLACEMENT SURGERY Right 1999   • KNEE REPLACEMENT SURGERY Left 2012   • REMOVAL OF OVARIAN CYST(S)       Social History:  Social History    Tobacco Use      Smoking statu rashes, itching, skin lesion, or excessive skin dryness. CARDIOVASCULAR:  Denies chest pain, chest pressure, chest discomfort, palpitations, edema, dyspnea on exertion or at rest.  RESPIRATORY:  Denies shortness of breath, wheezing, cough or sputum.   MOSES gallops. LUNGS: Clear to auscultation bilterally, no rales/rhonchi/wheezing. ABDOMEN:  Soft, nondistended, nontender, bowel sounds normal in all 4 quadrants, no masses, no hepatosplenomegaly.   EXTREMITIES:  No edema, no cyanosis, no clubbing, FROM, 2+ do Benign essential hypertension     Low back pain     Obliteration of lymphatic vessel     Malignant neoplasm of breast (HCC)     Diastolic dysfunction     History of ovarian cancer     Chronic seasonal allergic rhinitis due to pollen     Preop examination

## 2020-08-25 RX ORDER — BISOPROLOL FUMARATE 5 MG/1
TABLET ORAL
Qty: 90 TABLET | Refills: 1 | Status: SHIPPED | OUTPATIENT
Start: 2020-08-25 | End: 2021-02-03

## 2020-08-25 NOTE — TELEPHONE ENCOUNTER
Future appt:     Your appointments     Date & Time Appointment Department [facility])    Sep 01, 2020  2:30 PM CDT Medicare Annual Well Visit with Girish Gilliland MD [facility] [place] ([facility]            [place]

## 2020-09-01 ENCOUNTER — OFFICE VISIT (OUTPATIENT)
Dept: FAMILY MEDICINE CLINIC | Facility: CLINIC | Age: 85
End: 2020-09-01
Payer: MEDICARE

## 2020-09-01 ENCOUNTER — APPOINTMENT (OUTPATIENT)
Dept: LAB | Age: 85
End: 2020-09-01
Attending: FAMILY MEDICINE
Payer: MEDICARE

## 2020-09-01 VITALS
RESPIRATION RATE: 16 BRPM | SYSTOLIC BLOOD PRESSURE: 136 MMHG | WEIGHT: 172 LBS | BODY MASS INDEX: 31.65 KG/M2 | HEART RATE: 69 BPM | HEIGHT: 62 IN | TEMPERATURE: 98 F | OXYGEN SATURATION: 96 % | DIASTOLIC BLOOD PRESSURE: 80 MMHG

## 2020-09-01 DIAGNOSIS — Z85.3 HISTORY OF BREAST CANCER: ICD-10-CM

## 2020-09-01 DIAGNOSIS — J41.0 SIMPLE CHRONIC BRONCHITIS (HCC): ICD-10-CM

## 2020-09-01 DIAGNOSIS — D68.51 HETEROZYGOUS FACTOR V LEIDEN MUTATION (HCC): ICD-10-CM

## 2020-09-01 DIAGNOSIS — G89.29 CHRONIC MIDLINE LOW BACK PAIN WITH BILATERAL SCIATICA: ICD-10-CM

## 2020-09-01 DIAGNOSIS — Z00.00 ENCOUNTER FOR ANNUAL HEALTH EXAMINATION: Primary | ICD-10-CM

## 2020-09-01 DIAGNOSIS — M54.42 CHRONIC MIDLINE LOW BACK PAIN WITH BILATERAL SCIATICA: ICD-10-CM

## 2020-09-01 DIAGNOSIS — L03.119 RECURRENT CELLULITIS OF LOWER EXTREMITY: ICD-10-CM

## 2020-09-01 DIAGNOSIS — F41.9 ANXIETY: ICD-10-CM

## 2020-09-01 DIAGNOSIS — Z85.43 HISTORY OF OVARIAN CANCER: ICD-10-CM

## 2020-09-01 DIAGNOSIS — I10 BENIGN ESSENTIAL HYPERTENSION: ICD-10-CM

## 2020-09-01 DIAGNOSIS — J30.1 CHRONIC SEASONAL ALLERGIC RHINITIS DUE TO POLLEN: ICD-10-CM

## 2020-09-01 DIAGNOSIS — R61 SWEATING PROFUSELY: ICD-10-CM

## 2020-09-01 DIAGNOSIS — R05.9 COUGH: ICD-10-CM

## 2020-09-01 DIAGNOSIS — F32.0 CURRENT MILD EPISODE OF MAJOR DEPRESSIVE DISORDER WITHOUT PRIOR EPISODE (HCC): ICD-10-CM

## 2020-09-01 DIAGNOSIS — M54.41 CHRONIC MIDLINE LOW BACK PAIN WITH BILATERAL SCIATICA: ICD-10-CM

## 2020-09-01 DIAGNOSIS — R35.0 URINARY FREQUENCY: ICD-10-CM

## 2020-09-01 DIAGNOSIS — R60.9 EDEMA, UNSPECIFIED TYPE: ICD-10-CM

## 2020-09-01 DIAGNOSIS — Z13.6 SCREENING FOR CARDIOVASCULAR CONDITION: ICD-10-CM

## 2020-09-01 DIAGNOSIS — I51.89 DIASTOLIC DYSFUNCTION: ICD-10-CM

## 2020-09-01 DIAGNOSIS — Z86.718 HISTORY OF DVT (DEEP VEIN THROMBOSIS): ICD-10-CM

## 2020-09-01 DIAGNOSIS — M15.9 PRIMARY OSTEOARTHRITIS INVOLVING MULTIPLE JOINTS: ICD-10-CM

## 2020-09-01 DIAGNOSIS — Z98.42 HISTORY OF BILATERAL CATARACT EXTRACTION: ICD-10-CM

## 2020-09-01 DIAGNOSIS — Z98.41 HISTORY OF BILATERAL CATARACT EXTRACTION: ICD-10-CM

## 2020-09-01 DIAGNOSIS — I87.2 VENOUS STASIS DERMATITIS OF BOTH LOWER EXTREMITIES: ICD-10-CM

## 2020-09-01 PROBLEM — Z01.818 PREOP EXAMINATION: Status: RESOLVED | Noted: 2017-11-28 | Resolved: 2020-09-01

## 2020-09-01 PROBLEM — I80.9 SUPERFICIAL PHLEBITIS: Status: RESOLVED | Noted: 2018-11-10 | Resolved: 2020-09-01

## 2020-09-01 PROBLEM — I49.9 CARDIAC ARRHYTHMIA: Status: RESOLVED | Noted: 2018-09-25 | Resolved: 2020-09-01

## 2020-09-01 LAB
ALBUMIN SERPL-MCNC: 3.3 G/DL (ref 3.4–5)
ALBUMIN/GLOB SERPL: 0.8 {RATIO} (ref 1–2)
ALP LIVER SERPL-CCNC: 90 U/L (ref 55–142)
ALT SERPL-CCNC: 29 U/L (ref 13–56)
ANION GAP SERPL CALC-SCNC: 5 MMOL/L (ref 0–18)
AST SERPL-CCNC: 20 U/L (ref 15–37)
BASOPHILS # BLD AUTO: 0.04 X10(3) UL (ref 0–0.2)
BASOPHILS NFR BLD AUTO: 0.4 %
BILIRUB SERPL-MCNC: 0.3 MG/DL (ref 0.1–2)
BUN BLD-MCNC: 15 MG/DL (ref 7–18)
BUN/CREAT SERPL: 17.6 (ref 10–20)
CALCIUM BLD-MCNC: 9.1 MG/DL (ref 8.5–10.1)
CHLORIDE SERPL-SCNC: 107 MMOL/L (ref 98–112)
CHOLEST SMN-MCNC: 160 MG/DL (ref ?–200)
CO2 SERPL-SCNC: 26 MMOL/L (ref 21–32)
CREAT BLD-MCNC: 0.85 MG/DL (ref 0.55–1.02)
DEPRECATED RDW RBC AUTO: 55.5 FL (ref 35.1–46.3)
EOSINOPHIL # BLD AUTO: 0.18 X10(3) UL (ref 0–0.7)
EOSINOPHIL NFR BLD AUTO: 1.6 %
ERYTHROCYTE [DISTWIDTH] IN BLOOD BY AUTOMATED COUNT: 15 % (ref 11–15)
GLOBULIN PLAS-MCNC: 4.1 G/DL (ref 2.8–4.4)
GLUCOSE BLD-MCNC: 102 MG/DL (ref 70–99)
HCT VFR BLD AUTO: 46.1 % (ref 35–48)
HDLC SERPL-MCNC: 77 MG/DL (ref 40–59)
HGB BLD-MCNC: 14.2 G/DL (ref 12–16)
IMM GRANULOCYTES # BLD AUTO: 0.12 X10(3) UL (ref 0–1)
IMM GRANULOCYTES NFR BLD: 1.1 %
LDLC SERPL CALC-MCNC: 68 MG/DL (ref ?–100)
LYMPHOCYTES # BLD AUTO: 2.13 X10(3) UL (ref 1–4)
LYMPHOCYTES NFR BLD AUTO: 18.7 %
M PROTEIN MFR SERPL ELPH: 7.4 G/DL (ref 6.4–8.2)
MCH RBC QN AUTO: 30.6 PG (ref 26–34)
MCHC RBC AUTO-ENTMCNC: 30.8 G/DL (ref 31–37)
MCV RBC AUTO: 99.4 FL (ref 80–100)
MONOCYTES # BLD AUTO: 0.81 X10(3) UL (ref 0.1–1)
MONOCYTES NFR BLD AUTO: 7.1 %
NEUTROPHILS # BLD AUTO: 8.13 X10 (3) UL (ref 1.5–7.7)
NEUTROPHILS # BLD AUTO: 8.13 X10(3) UL (ref 1.5–7.7)
NEUTROPHILS NFR BLD AUTO: 71.1 %
NONHDLC SERPL-MCNC: 83 MG/DL (ref ?–130)
OSMOLALITY SERPL CALC.SUM OF ELEC: 287 MOSM/KG (ref 275–295)
PATIENT FASTING Y/N/NP: NO
PATIENT FASTING Y/N/NP: NO
PLATELET # BLD AUTO: 252 10(3)UL (ref 150–450)
POTASSIUM SERPL-SCNC: 4.1 MMOL/L (ref 3.5–5.1)
RBC # BLD AUTO: 4.64 X10(6)UL (ref 3.8–5.3)
SODIUM SERPL-SCNC: 138 MMOL/L (ref 136–145)
TRIGL SERPL-MCNC: 74 MG/DL (ref 30–149)
TSI SER-ACNC: 1.08 MIU/ML (ref 0.36–3.74)
VLDLC SERPL CALC-MCNC: 15 MG/DL (ref 0–30)
WBC # BLD AUTO: 11.4 X10(3) UL (ref 4–11)

## 2020-09-01 PROCEDURE — 36415 COLL VENOUS BLD VENIPUNCTURE: CPT | Performed by: FAMILY MEDICINE

## 2020-09-01 PROCEDURE — 80061 LIPID PANEL: CPT | Performed by: FAMILY MEDICINE

## 2020-09-01 PROCEDURE — 99213 OFFICE O/P EST LOW 20 MIN: CPT | Performed by: FAMILY MEDICINE

## 2020-09-01 PROCEDURE — 80053 COMPREHEN METABOLIC PANEL: CPT | Performed by: FAMILY MEDICINE

## 2020-09-01 PROCEDURE — 85025 COMPLETE CBC W/AUTO DIFF WBC: CPT | Performed by: FAMILY MEDICINE

## 2020-09-01 PROCEDURE — G0439 PPPS, SUBSEQ VISIT: HCPCS | Performed by: FAMILY MEDICINE

## 2020-09-01 PROCEDURE — 84443 ASSAY THYROID STIM HORMONE: CPT | Performed by: FAMILY MEDICINE

## 2020-09-01 RX ORDER — POTASSIUM CHLORIDE 750 MG/1
10 TABLET, EXTENDED RELEASE ORAL DAILY
Qty: 90 TABLET | Refills: 1 | Status: SHIPPED | OUTPATIENT
Start: 2020-09-01 | End: 2021-03-10

## 2020-09-01 RX ORDER — FUROSEMIDE 20 MG/1
20 TABLET ORAL 2 TIMES DAILY
Qty: 180 TABLET | Refills: 1 | Status: SHIPPED | OUTPATIENT
Start: 2020-09-01 | End: 2021-06-11

## 2020-09-01 NOTE — PATIENT INSTRUCTIONS
Recommended Websites for Advanced Directives    [email or web address].no. org/publications/Documents/personal_dec. pdf  An information packet, including necessary form from the [address] website. http://www. [place]. [place]/public/books/adv

## 2020-09-01 NOTE — PROGRESS NOTES
HPI:   Mary Velázquez is a 80year old female who presents for a Medicare Subsequent Annual Wellness visit (Pt already had Initial Annual Wellness).     Her last annual assessment has been over 1 year: Annual Physical due on 08/09/2020      She notes th 1-Yes  Do you have any tripping hazards?: 1-Yes(only outdoor stuff )  Are you on multiple medications?: 1-Yes  Does pain affect your day to day activities?: 0-No  Have you had any memory issues?: 0-No  Fall/Risk Scorin  Scoring Interpretation: 4+ At Ri hypertension     Low back pain     Obliteration of lymphatic vessel     Diastolic dysfunction     History of ovarian cancer     Chronic seasonal allergic rhinitis due to pollen     Dysuria     Cellulitis of right lower extremity     Venous stasis dermatiti tablet (15 mg total) by mouth daily. anastrozole 1 MG Oral Tab tab, Take 1 mg by mouth daily. Spacer/Aero-Holding Chambers (AEROCHAMBER MINI CHAMBER) Does not apply Device, 1 each by Does not apply route as needed (Use with Symbicort Inhaler).   Mele Lou history  ALL/ASTHMA: See HPI    EXAM:   /80   Pulse 69   Temp 98.4 °F (36.9 °C)   Resp 16   Ht 62\"   Wt 172 lb (78 kg)   SpO2 96%   BMI 31.46 kg/m²  Estimated body mass index is 31.46 kg/m² as calculated from the following:    Height as of this enco Administered   • FLU VAC High Dose 65 YRS & Older PRSV Free (38026) 11/15/2019   • FLUZONE 6 months and older PFS 0.5 ml (45043) 09/22/2018   • Pneumococcal (Prevnar 13) 08/09/2019   • Pneumovax 23 11/03/2014   • TDAP 08/09/2019        ASSESSMENT AND OTHER DIFFERENTIAL WITH PLATELET  - CBC W/ DIFFERENTIAL    6. Current mild episode of major depressive disorder without prior episode [facility])  She has both significant depression and anxiety. Much of this is related to her life situation.   Her depression is somew III    13. History of breast cancer  She has a history of breast cancer but no evidence for recurrence. - OFFICE/OUTPT VISIT,EST,LEVL III    14. Sweating profusely  She is sweating profusely with no obvious etiology.   This does not seem to be a marker of maintain positive mental well-being?: Puzzles      This section provided for quick review of chart, separate sheet to patient  [address] Internal Lab or Procedure External Lab or Procedure   Diabetes Screening      Hbg 65th birthday    Pneumococcal 23 (Pneumovax)  Covered Once after 65 11/03/2014 Please get once after your 65th birthday    Hepatitis B for Moderate/High Risk No vaccine history found Medium/high risk factors:   End-stage renal disease   Hemophiliacs who re

## 2020-09-02 ENCOUNTER — TELEPHONE (OUTPATIENT)
Dept: FAMILY MEDICINE CLINIC | Facility: CLINIC | Age: 85
End: 2020-09-02

## 2020-09-02 NOTE — TELEPHONE ENCOUNTER
----- Message from Clary Wall MD sent at 9/2/2020  7:57 AM CDT -----  Please call Deannie Kawasaki. Her labs look good. Cholesterol is 160 which is normal.  Her blood sugar is good at 102. Her potassium is normal at 4.1.   Her kidney function is normal at 58

## 2020-11-30 ENCOUNTER — OFFICE VISIT (OUTPATIENT)
Dept: FAMILY MEDICINE CLINIC | Facility: CLINIC | Age: 85
End: 2020-11-30
Payer: MEDICARE

## 2020-11-30 ENCOUNTER — TELEPHONE (OUTPATIENT)
Dept: FAMILY MEDICINE CLINIC | Facility: CLINIC | Age: 85
End: 2020-11-30

## 2020-11-30 VITALS
HEART RATE: 86 BPM | DIASTOLIC BLOOD PRESSURE: 68 MMHG | BODY MASS INDEX: 32.57 KG/M2 | OXYGEN SATURATION: 99 % | TEMPERATURE: 98 F | RESPIRATION RATE: 16 BRPM | HEIGHT: 62 IN | SYSTOLIC BLOOD PRESSURE: 130 MMHG | WEIGHT: 177 LBS

## 2020-11-30 DIAGNOSIS — R35.0 URINARY FREQUENCY: Primary | ICD-10-CM

## 2020-11-30 PROCEDURE — 87086 URINE CULTURE/COLONY COUNT: CPT | Performed by: NURSE PRACTITIONER

## 2020-11-30 PROCEDURE — 81001 URINALYSIS AUTO W/SCOPE: CPT | Performed by: NURSE PRACTITIONER

## 2020-11-30 PROCEDURE — 99213 OFFICE O/P EST LOW 20 MIN: CPT | Performed by: NURSE PRACTITIONER

## 2020-11-30 PROCEDURE — 81003 URINALYSIS AUTO W/O SCOPE: CPT | Performed by: NURSE PRACTITIONER

## 2020-11-30 PROCEDURE — 87077 CULTURE AEROBIC IDENTIFY: CPT | Performed by: NURSE PRACTITIONER

## 2020-11-30 NOTE — TELEPHONE ENCOUNTER
As below. Appt given today with Michelle Sahu for evaluation of  UTI Sx. Carlie Almanzar, 11/30/20, 10:08 AM    Future appt:     Your appointments     Date & Time Appointment Department [facility])    Nov 30, 2020  3:00 PM CST Exam - Established with Wayne Burden,

## 2020-11-30 NOTE — PATIENT INSTRUCTIONS
Continue to monitor symptoms     Urine will be sent out for additional testing, may need antibiotics     No submerging in water while symptomatic

## 2020-12-01 NOTE — PROGRESS NOTES
HPI:    Patient ID: Shashi Gracia is a 80year old female. Patient presents to the clinic today for evaluation increase urinary frequency. States yesterday noticed she was going to the bathroom more frequently.   States that she is concerned that sh (two) times daily for 7 days. 14 tablet 0   • anastrozole 1 MG Oral Tab tab Take 1 mg by mouth daily. Allergies:  Erythromycin            HIVES   PHYSICAL EXAM:   Physical Exam   Constitutional: She is oriented to person, place, and time.  She appears

## 2020-12-03 ENCOUNTER — TELEPHONE (OUTPATIENT)
Dept: FAMILY MEDICINE CLINIC | Facility: CLINIC | Age: 85
End: 2020-12-03

## 2020-12-03 RX ORDER — CIPROFLOXACIN 500 MG/1
500 TABLET, FILM COATED ORAL 2 TIMES DAILY
Qty: 14 TABLET | Refills: 0 | Status: SHIPPED | OUTPATIENT
Start: 2020-12-03 | End: 2020-12-10

## 2020-12-03 NOTE — TELEPHONE ENCOUNTER
----- Message from ALAYNA Hua sent at 12/3/2020 12:36 PM CST -----  Urine culture came back showing abnormal bacteria growth in the urine. Will begin patient on cipro 500mg twice a day for the next 7 days.   We are still waiting on the culture sen

## 2021-01-19 ENCOUNTER — TELEPHONE (OUTPATIENT)
Dept: FAMILY MEDICINE CLINIC | Facility: CLINIC | Age: 86
End: 2021-01-19

## 2021-01-19 ENCOUNTER — OFFICE VISIT (OUTPATIENT)
Dept: FAMILY MEDICINE CLINIC | Facility: CLINIC | Age: 86
End: 2021-01-19
Payer: MEDICARE

## 2021-01-19 ENCOUNTER — LAB ENCOUNTER (OUTPATIENT)
Dept: LAB | Age: 86
End: 2021-01-19
Attending: NURSE PRACTITIONER
Payer: MEDICARE

## 2021-01-19 VITALS
BODY MASS INDEX: 32.39 KG/M2 | SYSTOLIC BLOOD PRESSURE: 130 MMHG | WEIGHT: 176 LBS | DIASTOLIC BLOOD PRESSURE: 64 MMHG | HEART RATE: 80 BPM | RESPIRATION RATE: 16 BRPM | OXYGEN SATURATION: 99 % | HEIGHT: 62 IN

## 2021-01-19 DIAGNOSIS — R21 RASH: Primary | ICD-10-CM

## 2021-01-19 DIAGNOSIS — R35.0 URINARY FREQUENCY: ICD-10-CM

## 2021-01-19 DIAGNOSIS — N39.0 RECURRENT UTI: ICD-10-CM

## 2021-01-19 LAB
ALBUMIN SERPL-MCNC: 3.4 G/DL (ref 3.4–5)
ALBUMIN/GLOB SERPL: 0.8 {RATIO} (ref 1–2)
ALP LIVER SERPL-CCNC: 110 U/L
ALT SERPL-CCNC: 25 U/L
ANION GAP SERPL CALC-SCNC: 6 MMOL/L (ref 0–18)
AST SERPL-CCNC: 21 U/L (ref 15–37)
BASOPHILS # BLD AUTO: 0.06 X10(3) UL (ref 0–0.2)
BASOPHILS NFR BLD AUTO: 0.6 %
BILIRUB SERPL-MCNC: 0.5 MG/DL (ref 0.1–2)
BILIRUB UR QL STRIP.AUTO: NEGATIVE
BUN BLD-MCNC: 18 MG/DL (ref 7–18)
BUN/CREAT SERPL: 20.9 (ref 10–20)
CALCIUM BLD-MCNC: 9.3 MG/DL (ref 8.5–10.1)
CHLORIDE SERPL-SCNC: 107 MMOL/L (ref 98–112)
CLARITY UR REFRACT.AUTO: CLEAR
CO2 SERPL-SCNC: 24 MMOL/L (ref 21–32)
COLOR UR AUTO: YELLOW
CREAT BLD-MCNC: 0.86 MG/DL
DEPRECATED RDW RBC AUTO: 45.1 FL (ref 35.1–46.3)
EOSINOPHIL # BLD AUTO: 0.21 X10(3) UL (ref 0–0.7)
EOSINOPHIL NFR BLD AUTO: 2 %
ERYTHROCYTE [DISTWIDTH] IN BLOOD BY AUTOMATED COUNT: 13.5 % (ref 11–15)
GLOBULIN PLAS-MCNC: 4.4 G/DL (ref 2.8–4.4)
GLUCOSE BLD-MCNC: 106 MG/DL (ref 70–99)
GLUCOSE UR STRIP.AUTO-MCNC: NEGATIVE MG/DL
HCT VFR BLD AUTO: 43.6 %
HGB BLD-MCNC: 14.5 G/DL
IMM GRANULOCYTES # BLD AUTO: 0.03 X10(3) UL (ref 0–1)
IMM GRANULOCYTES NFR BLD: 0.3 %
KETONES UR STRIP.AUTO-MCNC: NEGATIVE MG/DL
LEUKOCYTE ESTERASE UR QL STRIP.AUTO: NEGATIVE
LYMPHOCYTES # BLD AUTO: 1.97 X10(3) UL (ref 1–4)
LYMPHOCYTES NFR BLD AUTO: 18.6 %
M PROTEIN MFR SERPL ELPH: 7.8 G/DL (ref 6.4–8.2)
MCH RBC QN AUTO: 30.4 PG (ref 26–34)
MCHC RBC AUTO-ENTMCNC: 33.3 G/DL (ref 31–37)
MCV RBC AUTO: 91.4 FL
MONOCYTES # BLD AUTO: 0.82 X10(3) UL (ref 0.1–1)
MONOCYTES NFR BLD AUTO: 7.8 %
NEUTROPHILS # BLD AUTO: 7.48 X10 (3) UL (ref 1.5–7.7)
NEUTROPHILS # BLD AUTO: 7.48 X10(3) UL (ref 1.5–7.7)
NEUTROPHILS NFR BLD AUTO: 70.7 %
NITRITE UR QL STRIP.AUTO: NEGATIVE
OSMOLALITY SERPL CALC.SUM OF ELEC: 286 MOSM/KG (ref 275–295)
PATIENT FASTING Y/N/NP: NO
PH UR STRIP.AUTO: 7 [PH] (ref 4.5–8)
PLATELET # BLD AUTO: 248 10(3)UL (ref 150–450)
POTASSIUM SERPL-SCNC: 4 MMOL/L (ref 3.5–5.1)
PROT UR STRIP.AUTO-MCNC: NEGATIVE MG/DL
RBC # BLD AUTO: 4.77 X10(6)UL
RBC UR QL AUTO: NEGATIVE
SODIUM SERPL-SCNC: 137 MMOL/L (ref 136–145)
SP GR UR STRIP.AUTO: 1.01 (ref 1–1.03)
UROBILINOGEN UR STRIP.AUTO-MCNC: <2 MG/DL
WBC # BLD AUTO: 10.6 X10(3) UL (ref 4–11)

## 2021-01-19 PROCEDURE — 36415 COLL VENOUS BLD VENIPUNCTURE: CPT | Performed by: NURSE PRACTITIONER

## 2021-01-19 PROCEDURE — 80053 COMPREHEN METABOLIC PANEL: CPT | Performed by: NURSE PRACTITIONER

## 2021-01-19 PROCEDURE — 99214 OFFICE O/P EST MOD 30 MIN: CPT | Performed by: NURSE PRACTITIONER

## 2021-01-19 PROCEDURE — 81003 URINALYSIS AUTO W/O SCOPE: CPT

## 2021-01-19 PROCEDURE — 85025 COMPLETE CBC W/AUTO DIFF WBC: CPT | Performed by: NURSE PRACTITIONER

## 2021-01-19 NOTE — PROGRESS NOTES
Stacie Lloyd is a 80year old female.   Patient presents with:  Derm Problem: red/irritations through out chest/legs/arms, itching at itmes      HPI:   Complaints of rash to body - over a weeks ago- started to arm - fading now mostly on chest - and a INHALE TWO PUFFS BY MOUTH TWICE A DAY 3 Inhaler 1   • Loteprednol Etabonate 0.5 % Ophthalmic Suspension Place 1 drop into the left eye every 6 (six) hours.   0   • ELIQUIS 2.5 MG Oral Tab TAKE ONE TABLET BY MOUTH TWICE A DAY  3   • anastrozole 1 MG Oral Tab apparent distress  SKIN: Chest–3, nickel size, annular, erythematous, lesions noted–dillon with palpation–neck–raised, dime size, annular wheal right thigh–2, dime size, lesions petechial cluster appearance.   HEENT: atraumatic, normocephalic,ears and throa

## 2021-01-19 NOTE — TELEPHONE ENCOUNTER
Pt states that she started with 5-6 red spots on her inner left arm x 1 week ago. She states that they have gone away but she now has 3 spots on the right side of her chest, 1 spot on the front of her neck, 1 on her upper right leg.      She states that

## 2021-01-19 NOTE — TELEPHONE ENCOUNTER
Pt notified and verbalized understanding.      Future Appointments   Date Time Provider Ina Briscoe   1/19/2021  1:15 PM ALAYNA Alfaro [facility] [place] [facility]

## 2021-01-19 NOTE — PATIENT INSTRUCTIONS
Will check blood work and urine -      Follow up with urologist - for evaluation      For your rash -  Take Claritin daily -  Monitor - follow up if symptoms persist or increase

## 2021-01-19 NOTE — TELEPHONE ENCOUNTER
Refill done. PT is overdue for wellness - please call pt and schedule a wellness - can be video IF PT CAN MANAGE TO DO COMPUTER or smart form - if not, then should be live.  ALSO needs to go to ACL lab prior to get fasting blood work - I am sending in the order now.   And explain she must make apt for that also.  Thank you   Symptoms - Rash   - wants to discuss

## 2021-01-20 ENCOUNTER — TELEPHONE (OUTPATIENT)
Dept: FAMILY MEDICINE CLINIC | Facility: CLINIC | Age: 86
End: 2021-01-20

## 2021-01-20 NOTE — TELEPHONE ENCOUNTER
----- Message from ALAYNA Tapia sent at 1/20/2021  8:14 AM CST -----  Please notify patient that her urinalysis is normal she does not have a UTI.

## 2021-02-03 DIAGNOSIS — Z23 NEED FOR VACCINATION: ICD-10-CM

## 2021-02-03 RX ORDER — BISOPROLOL FUMARATE 5 MG/1
TABLET ORAL
Qty: 90 TABLET | Refills: 1 | Status: SHIPPED | OUTPATIENT
Start: 2021-02-03 | End: 2021-08-11

## 2021-02-03 NOTE — TELEPHONE ENCOUNTER
Future appt:     Last Appointment with provider:   9/1/2020; Return in 6 months (on 3/1/2021).     Last appointment at [facility] [place]:  1/19/2021  Cholesterol, Total (mg/dL)   Date Value   09/01/2020 160     HDL Cholesterol (mg/dL)   Date Value   09/01/2020 7

## 2021-03-10 RX ORDER — POTASSIUM CHLORIDE 750 MG/1
TABLET, EXTENDED RELEASE ORAL
Qty: 90 TABLET | Refills: 1 | Status: SHIPPED | OUTPATIENT
Start: 2021-03-10 | End: 2021-09-14

## 2021-03-10 RX ORDER — BUDESONIDE AND FORMOTEROL FUMARATE DIHYDRATE 160; 4.5 UG/1; UG/1
AEROSOL RESPIRATORY (INHALATION)
Qty: 3 INHALER | Refills: 1 | Status: SHIPPED | OUTPATIENT
Start: 2021-03-10

## 2021-03-10 NOTE — TELEPHONE ENCOUNTER
Potassium: 9/1/20  Symbicort: 1/7/20    Future appt:    Last Appointment with provider:     9/1/20    Last appointment at [facility] [place]:  1/19/2021  Cholesterol, Total (mg/dL)   Date Value   09/01/2020 160     HDL Cholesterol (mg/dL)   Date Value   09/01/20

## 2021-03-12 ENCOUNTER — TELEPHONE (OUTPATIENT)
Dept: FAMILY MEDICINE CLINIC | Facility: CLINIC | Age: 86
End: 2021-03-12

## 2021-03-12 ENCOUNTER — OFFICE VISIT (OUTPATIENT)
Dept: FAMILY MEDICINE CLINIC | Facility: CLINIC | Age: 86
End: 2021-03-12
Payer: MEDICARE

## 2021-03-12 VITALS
RESPIRATION RATE: 16 BRPM | DIASTOLIC BLOOD PRESSURE: 68 MMHG | HEART RATE: 71 BPM | HEIGHT: 62 IN | OXYGEN SATURATION: 98 % | TEMPERATURE: 98 F | BODY MASS INDEX: 33.16 KG/M2 | WEIGHT: 180.19 LBS | SYSTOLIC BLOOD PRESSURE: 126 MMHG

## 2021-03-12 DIAGNOSIS — R30.0 DYSURIA: Primary | ICD-10-CM

## 2021-03-12 DIAGNOSIS — R60.0 BILATERAL LOWER EXTREMITY EDEMA: ICD-10-CM

## 2021-03-12 DIAGNOSIS — L30.9 DERMATITIS OF BOTH FEET: ICD-10-CM

## 2021-03-12 PROBLEM — D75.89 MACROCYTOSIS: Status: ACTIVE | Noted: 2020-09-25

## 2021-03-12 LAB
APPEARANCE: CLEAR
BILIRUB UR QL STRIP.AUTO: NEGATIVE
BILIRUBIN: NEGATIVE
COLOR UR AUTO: YELLOW
GLUCOSE (URINE DIPSTICK): NEGATIVE MG/DL
GLUCOSE UR STRIP.AUTO-MCNC: NEGATIVE MG/DL
HYALINE CASTS #/AREA URNS AUTO: PRESENT /LPF
KETONES (URINE DIPSTICK): NEGATIVE MG/DL
KETONES UR STRIP.AUTO-MCNC: NEGATIVE MG/DL
MULTISTIX LOT#: 1044 NUMERIC
NITRITE UR QL STRIP.AUTO: POSITIVE
NITRITE, URINE: NEGATIVE
OCCULT BLOOD: NEGATIVE
PH UR STRIP.AUTO: 7 [PH] (ref 5–8)
PH, URINE: 7 (ref 4.5–8)
PROT UR STRIP.AUTO-MCNC: NEGATIVE MG/DL
PROTEIN (URINE DIPSTICK): NEGATIVE MG/DL
RBC UR QL AUTO: NEGATIVE
SP GR UR STRIP.AUTO: 1.01 (ref 1–1.03)
SPECIFIC GRAVITY: 1.01 (ref 1–1.03)
URINE-COLOR: YELLOW
UROBILINOGEN UR STRIP.AUTO-MCNC: <2 MG/DL
UROBILINOGEN,SEMI-QN: 0.2 MG/DL (ref 0–1.9)

## 2021-03-12 PROCEDURE — 81003 URINALYSIS AUTO W/O SCOPE: CPT | Performed by: NURSE PRACTITIONER

## 2021-03-12 PROCEDURE — 87077 CULTURE AEROBIC IDENTIFY: CPT | Performed by: NURSE PRACTITIONER

## 2021-03-12 PROCEDURE — 87086 URINE CULTURE/COLONY COUNT: CPT | Performed by: NURSE PRACTITIONER

## 2021-03-12 PROCEDURE — 99214 OFFICE O/P EST MOD 30 MIN: CPT | Performed by: NURSE PRACTITIONER

## 2021-03-12 PROCEDURE — 87186 SC STD MICRODIL/AGAR DIL: CPT | Performed by: NURSE PRACTITIONER

## 2021-03-12 PROCEDURE — 81001 URINALYSIS AUTO W/SCOPE: CPT | Performed by: NURSE PRACTITIONER

## 2021-03-12 RX ORDER — NYSTATIN 100000 [USP'U]/G
1 POWDER TOPICAL 2 TIMES DAILY
Qty: 60 G | Refills: 0 | Status: SHIPPED | OUTPATIENT
Start: 2021-03-12 | End: 2021-03-22

## 2021-03-12 NOTE — PROGRESS NOTES
Patient ID:   So Expose  is a 80year old female    Allergies    Erythromycin            HIVES  Medications   Nystatin [id number] UNIT/GM External Powder, Apply 1 Application topically 2 (two) times daily for 10 days. , Disp: 60 g, Rfl: 0        HPI: PHYSICAL EXAM:    Physical Exam   /68 (BP Location: Left arm, Patient Position: Sitting, Cuff Size: adult)   Pulse 71   Temp 98 °F (36.7 °C) (Temporal)   Resp 16   Ht 5' 2\" (1.575 m)   Wt 180 lb 3.2 oz (81.7 kg)   SpO2 98%   BMI 32.96 kg/m²   Wt R feet above the level of her heart. Goal to decrease swelling with these diuretics though tomorrow patient will be able to wear her compression stockings again, wear during the day.     Foot tenderness without signs of bacterial infection or wounds, suspect Powder 60 g 0     Sig: Apply 1 Application topically 2 (two) times daily for 10 days. Imaging & Consults:  None    This note was created utilizing Dragon speech recognition software.  Please excuse any grammatical errors.  Call my office if you have a

## 2021-03-12 NOTE — TELEPHONE ENCOUNTER
Patient states she has not been able to wear her compression stockings all week. States right leg is somewhat swollen. Toes right foot have some discoloration. Patient also questions if she may have a UTI.     Appt given today with Dora Valente for evalua

## 2021-03-12 NOTE — PATIENT INSTRUCTIONS
Increase your furosemide (lasix) to 2 tablets in the morning (40mg) and 1 tablet (20mg) in the evening for the next three days. Increase your potassium to 2 tablets a day for the next three days.   Keep your feet elevated during the day, prop up two pillow

## 2021-03-15 ENCOUNTER — TELEPHONE (OUTPATIENT)
Dept: FAMILY MEDICINE CLINIC | Facility: CLINIC | Age: 86
End: 2021-03-15

## 2021-03-15 NOTE — TELEPHONE ENCOUNTER
----- Message from ALAYNA Jerry sent at 3/15/2021 10:03 AM CDT -----  Patient with low-level E. coli in the urine. Since patient symptomatic though not much different from baseline we will treat. We will treat with nitrofurantoin twice a day for 7

## 2021-06-04 ENCOUNTER — OFFICE VISIT (OUTPATIENT)
Dept: FAMILY MEDICINE CLINIC | Facility: CLINIC | Age: 86
End: 2021-06-04
Payer: MEDICARE

## 2021-06-04 VITALS
DIASTOLIC BLOOD PRESSURE: 78 MMHG | RESPIRATION RATE: 18 BRPM | TEMPERATURE: 99 F | HEIGHT: 62 IN | WEIGHT: 174 LBS | HEART RATE: 75 BPM | BODY MASS INDEX: 32.02 KG/M2 | OXYGEN SATURATION: 96 % | SYSTOLIC BLOOD PRESSURE: 122 MMHG

## 2021-06-04 DIAGNOSIS — N39.0 RECURRENT UTI: Primary | ICD-10-CM

## 2021-06-04 DIAGNOSIS — G57.91 NEUROPATHY OF FOOT, RIGHT: ICD-10-CM

## 2021-06-04 PROCEDURE — 87077 CULTURE AEROBIC IDENTIFY: CPT | Performed by: NURSE PRACTITIONER

## 2021-06-04 PROCEDURE — 99213 OFFICE O/P EST LOW 20 MIN: CPT | Performed by: NURSE PRACTITIONER

## 2021-06-04 PROCEDURE — 87086 URINE CULTURE/COLONY COUNT: CPT | Performed by: NURSE PRACTITIONER

## 2021-06-04 PROCEDURE — 87186 SC STD MICRODIL/AGAR DIL: CPT | Performed by: NURSE PRACTITIONER

## 2021-06-04 PROCEDURE — 81003 URINALYSIS AUTO W/O SCOPE: CPT | Performed by: NURSE PRACTITIONER

## 2021-06-04 RX ORDER — GABAPENTIN 100 MG/1
100 CAPSULE ORAL NIGHTLY
Qty: 30 CAPSULE | Refills: 0 | Status: SHIPPED | OUTPATIENT
Start: 2021-06-04 | End: 2021-06-30

## 2021-06-04 RX ORDER — CEPHALEXIN 500 MG/1
500 CAPSULE ORAL 2 TIMES DAILY
Qty: 14 CAPSULE | Refills: 0 | Status: SHIPPED | OUTPATIENT
Start: 2021-06-04 | End: 2021-06-11

## 2021-06-04 NOTE — PATIENT INSTRUCTIONS
Take the Keflex twice a day for 7 days     Start the gabapentin nightly. Call with an update in a week or 2. Follow up as needed.

## 2021-06-04 NOTE — PROGRESS NOTES
HPI:    Patient ID: Stacie Lloyd is a 80year old female. HPI     Patient is present with concerns about possible urinary tract infection. States that she is not doing well with her nerves. Is having trouble driving.  Having trouble with her toes mouth daily.        Allergies:  Erythromycin            HIVES   PHYSICAL EXAM:      06/04/21  0935   BP: 122/78   Pulse: 75   Resp: 18   Temp: 98.6 °F (37 °C)   TempSrc: Temporal   SpO2: 96%   Weight: 174 lb (78.9 kg)   Height: 5' 2\" (1.575 m)         Body

## 2021-06-07 ENCOUNTER — TELEPHONE (OUTPATIENT)
Dept: FAMILY MEDICINE CLINIC | Facility: CLINIC | Age: 86
End: 2021-06-07

## 2021-06-07 NOTE — TELEPHONE ENCOUNTER
----- Message from ALAYNA Nixon sent at 6/6/2021 10:53 AM CDT -----  Urine is positive for E coli. Should respond to the Keflex that she is on. Please let patient know. Thank you.

## 2021-06-11 DIAGNOSIS — I10 BENIGN ESSENTIAL HYPERTENSION: Primary | ICD-10-CM

## 2021-06-11 RX ORDER — FUROSEMIDE 20 MG/1
TABLET ORAL
Qty: 180 TABLET | Refills: 1 | Status: SHIPPED | OUTPATIENT
Start: 2021-06-11 | End: 2021-12-27

## 2021-06-11 NOTE — TELEPHONE ENCOUNTER
Future appt:    Last Appointment with provider:   9/1/20(PX)-F/U in 6months  Last appointment at [facility]:  6/4/2021    furosemide 20 MG Oral Tab    180tab  1refill        Filled:9/1/20 Summary: Take 1 tablet (20 mg total) by mouth 2 (two) times daily

## 2021-06-14 ENCOUNTER — TELEPHONE (OUTPATIENT)
Dept: FAMILY MEDICINE CLINIC | Facility: CLINIC | Age: 86
End: 2021-06-14

## 2021-06-14 NOTE — TELEPHONE ENCOUNTER
Saw her about a week, put her on Gabapentin 100mg, she feels it's not working. No future appointments.

## 2021-06-15 RX ORDER — ESCITALOPRAM OXALATE 5 MG/1
5 TABLET ORAL DAILY
Qty: 30 TABLET | Refills: 0 | Status: SHIPPED | OUTPATIENT
Start: 2021-06-15 | End: 2021-06-30

## 2021-06-15 NOTE — TELEPHONE ENCOUNTER
Recommend starting the Lexapro at 5 mg. Can hold the gabapentin. Sent to [facility].

## 2021-06-16 NOTE — TELEPHONE ENCOUNTER
Relayed Keiko's note to pt. Pt verbalized understanding of instructions and will call with an update on how she is doing soon.

## 2021-06-30 ENCOUNTER — TELEPHONE (OUTPATIENT)
Dept: FAMILY MEDICINE CLINIC | Facility: CLINIC | Age: 86
End: 2021-06-30

## 2021-06-30 RX ORDER — ESCITALOPRAM OXALATE 5 MG/1
5 TABLET ORAL 2 TIMES DAILY
Qty: 60 TABLET | Refills: 0 | Status: SHIPPED | OUTPATIENT
Start: 2021-06-30 | End: 2021-08-03

## 2021-06-30 NOTE — TELEPHONE ENCOUNTER
Patient was seen 6/4/21. States she wakes up feeling anxious and nervous. She takes her Lexapro with breakfast and starts to feel better in an hour or two. She states she has been feeling better than before, but not as good as she wants to be.    She does h

## 2021-07-07 ENCOUNTER — TELEPHONE (OUTPATIENT)
Dept: FAMILY MEDICINE CLINIC | Facility: CLINIC | Age: 86
End: 2021-07-07

## 2021-07-07 NOTE — TELEPHONE ENCOUNTER
Patient wanted to take a supplement called NeuRX-TF for neuropathy. She wanted to check if it was okay to take with her other medication.

## 2021-07-07 NOTE — TELEPHONE ENCOUNTER
She states for the most part she is feeling better.  Her physical is due in sept and will ask Dr Abel Stewart about supplement as well at that time,

## 2021-07-07 NOTE — TELEPHONE ENCOUNTER
When reviewing this supplement, it has side effects of low blood sugar--headache, hunger, weakness, sweating, confusion, irritability, dizziness, fast heart rate, or feeling jittery. I would not recommend this especially since she lives alone.    Is she fee

## 2021-07-12 ENCOUNTER — TELEPHONE (OUTPATIENT)
Dept: FAMILY MEDICINE CLINIC | Facility: CLINIC | Age: 86
End: 2021-07-12

## 2021-07-12 ENCOUNTER — LABORATORY ENCOUNTER (OUTPATIENT)
Dept: LAB | Age: 86
End: 2021-07-12
Attending: FAMILY MEDICINE
Payer: MEDICARE

## 2021-07-12 DIAGNOSIS — N39.0 RECURRENT UTI: Primary | ICD-10-CM

## 2021-07-12 DIAGNOSIS — N39.0 RECURRENT UTI: ICD-10-CM

## 2021-07-12 LAB
BILIRUB UR QL STRIP.AUTO: NEGATIVE
COLOR UR AUTO: YELLOW
GLUCOSE UR STRIP.AUTO-MCNC: NEGATIVE MG/DL
KETONES UR STRIP.AUTO-MCNC: NEGATIVE MG/DL
LEUKOCYTE ESTERASE UR QL STRIP.AUTO: NEGATIVE
NITRITE UR QL STRIP.AUTO: NEGATIVE
PH UR STRIP.AUTO: 7 [PH] (ref 5–8)
PROT UR STRIP.AUTO-MCNC: NEGATIVE MG/DL
RBC UR QL AUTO: NEGATIVE
SP GR UR STRIP.AUTO: 1.01 (ref 1–1.03)
UROBILINOGEN UR STRIP.AUTO-MCNC: <2 MG/DL

## 2021-07-12 PROCEDURE — 87086 URINE CULTURE/COLONY COUNT: CPT

## 2021-07-12 PROCEDURE — 81003 URINALYSIS AUTO W/O SCOPE: CPT

## 2021-07-12 NOTE — TELEPHONE ENCOUNTER
Patient needing to get a ride to come in for urine testing. She will call back to schedule with lab.   Lamine Hand, 07/12/21, 11:18 AM

## 2021-07-13 ENCOUNTER — TELEPHONE (OUTPATIENT)
Dept: FAMILY MEDICINE CLINIC | Facility: CLINIC | Age: 86
End: 2021-07-13

## 2021-07-13 NOTE — TELEPHONE ENCOUNTER
----- Message from Theron Matias MD sent at 7/13/2021 11:15 AM CDT -----  Good news. The urine test is completely normal.  There is no sign of urinary tract infection.

## 2021-07-14 ENCOUNTER — TELEPHONE (OUTPATIENT)
Dept: FAMILY MEDICINE CLINIC | Facility: CLINIC | Age: 86
End: 2021-07-14

## 2021-07-14 NOTE — TELEPHONE ENCOUNTER
Patient was informed yesterday with U/A results. Urine Negative for infection.   Digna Torres, 07/14/21, 9:45 AM

## 2021-07-14 NOTE — TELEPHONE ENCOUNTER
----- Message from Galen Carlos MD sent at 7/13/2021  5:03 PM CDT -----  The urine culture is negative. There is no indication for a urinary tract infection.

## 2021-08-03 RX ORDER — ESCITALOPRAM OXALATE 5 MG/1
5 TABLET ORAL 2 TIMES DAILY
Qty: 180 TABLET | Refills: 1 | Status: SHIPPED | OUTPATIENT
Start: 2021-08-03 | End: 2021-09-07

## 2021-08-03 NOTE — TELEPHONE ENCOUNTER
Escitalopram: 6/30/21    Future appt:     Your appointments     Date & Time Appointment Department [facility])    Sep 07, 2021  9:30 AM CDT Medicare Annual Well Visit with Alfred Spain MD [address] [place] [address], [facility]

## 2021-08-11 RX ORDER — BISOPROLOL FUMARATE 5 MG/1
TABLET ORAL
Qty: 90 TABLET | Refills: 1 | Status: SHIPPED | OUTPATIENT
Start: 2021-08-11 | End: 2021-12-27

## 2021-08-11 NOTE — TELEPHONE ENCOUNTER
Bisoprolol: 2/3/21    Future appt:     Your appointments     Date & Time Appointment Department [facility])    Sep 07, 2021  9:30 AM CDT Medicare Annual Well Visit with Ruben Rose MD [address] [place] [address], [facility]

## 2021-09-07 ENCOUNTER — LAB ENCOUNTER (OUTPATIENT)
Dept: LAB | Age: 86
End: 2021-09-07
Attending: FAMILY MEDICINE
Payer: MEDICARE

## 2021-09-07 ENCOUNTER — OFFICE VISIT (OUTPATIENT)
Dept: FAMILY MEDICINE CLINIC | Facility: CLINIC | Age: 86
End: 2021-09-07
Payer: MEDICARE

## 2021-09-07 VITALS
HEART RATE: 80 BPM | DIASTOLIC BLOOD PRESSURE: 64 MMHG | BODY MASS INDEX: 32.05 KG/M2 | HEIGHT: 62 IN | WEIGHT: 174.19 LBS | RESPIRATION RATE: 16 BRPM | SYSTOLIC BLOOD PRESSURE: 120 MMHG | TEMPERATURE: 99 F

## 2021-09-07 DIAGNOSIS — Z86.718 HISTORY OF DVT (DEEP VEIN THROMBOSIS): ICD-10-CM

## 2021-09-07 DIAGNOSIS — D68.51 HETEROZYGOUS FACTOR V LEIDEN MUTATION (HCC): ICD-10-CM

## 2021-09-07 DIAGNOSIS — R30.0 DYSURIA: ICD-10-CM

## 2021-09-07 DIAGNOSIS — R60.9 EDEMA, UNSPECIFIED TYPE: ICD-10-CM

## 2021-09-07 DIAGNOSIS — Z13.6 SCREENING FOR CARDIOVASCULAR CONDITION: ICD-10-CM

## 2021-09-07 DIAGNOSIS — L03.119 RECURRENT CELLULITIS OF LOWER EXTREMITY: ICD-10-CM

## 2021-09-07 DIAGNOSIS — Z85.3 HISTORY OF BREAST CANCER: ICD-10-CM

## 2021-09-07 DIAGNOSIS — G89.29 CHRONIC MIDLINE LOW BACK PAIN WITH BILATERAL SCIATICA: ICD-10-CM

## 2021-09-07 DIAGNOSIS — Z17.0 MALIGNANT NEOPLASM OF UPPER-OUTER QUADRANT OF RIGHT BREAST IN FEMALE, ESTROGEN RECEPTOR POSITIVE (HCC): ICD-10-CM

## 2021-09-07 DIAGNOSIS — M54.41 CHRONIC MIDLINE LOW BACK PAIN WITH BILATERAL SCIATICA: ICD-10-CM

## 2021-09-07 DIAGNOSIS — Z98.42 HISTORY OF BILATERAL CATARACT EXTRACTION: ICD-10-CM

## 2021-09-07 DIAGNOSIS — F32.0 CURRENT MILD EPISODE OF MAJOR DEPRESSIVE DISORDER WITHOUT PRIOR EPISODE (HCC): ICD-10-CM

## 2021-09-07 DIAGNOSIS — I10 BENIGN ESSENTIAL HYPERTENSION: ICD-10-CM

## 2021-09-07 DIAGNOSIS — C50.411 MALIGNANT NEOPLASM OF UPPER-OUTER QUADRANT OF RIGHT BREAST IN FEMALE, ESTROGEN RECEPTOR POSITIVE (HCC): ICD-10-CM

## 2021-09-07 DIAGNOSIS — Z98.41 HISTORY OF BILATERAL CATARACT EXTRACTION: ICD-10-CM

## 2021-09-07 DIAGNOSIS — N81.10 PROLAPSE OF VAGINAL WALL: ICD-10-CM

## 2021-09-07 DIAGNOSIS — M54.42 CHRONIC MIDLINE LOW BACK PAIN WITH BILATERAL SCIATICA: ICD-10-CM

## 2021-09-07 DIAGNOSIS — Z85.43 HISTORY OF OVARIAN CANCER: ICD-10-CM

## 2021-09-07 DIAGNOSIS — Z00.00 ENCOUNTER FOR ANNUAL HEALTH EXAMINATION: Primary | ICD-10-CM

## 2021-09-07 DIAGNOSIS — M15.9 PRIMARY OSTEOARTHRITIS INVOLVING MULTIPLE JOINTS: ICD-10-CM

## 2021-09-07 DIAGNOSIS — J41.0 SIMPLE CHRONIC BRONCHITIS (HCC): ICD-10-CM

## 2021-09-07 DIAGNOSIS — F41.9 ANXIETY: ICD-10-CM

## 2021-09-07 DIAGNOSIS — J30.1 CHRONIC SEASONAL ALLERGIC RHINITIS DUE TO POLLEN: ICD-10-CM

## 2021-09-07 DIAGNOSIS — I51.89 DIASTOLIC DYSFUNCTION: ICD-10-CM

## 2021-09-07 DIAGNOSIS — I87.2 VENOUS STASIS DERMATITIS OF BOTH LOWER EXTREMITIES: ICD-10-CM

## 2021-09-07 DIAGNOSIS — I89.0 OBLITERATION OF LYMPHATIC VESSEL: ICD-10-CM

## 2021-09-07 PROBLEM — D75.89 MACROCYTOSIS: Status: RESOLVED | Noted: 2020-09-25 | Resolved: 2021-09-07

## 2021-09-07 PROBLEM — R35.0 URINARY FREQUENCY: Status: RESOLVED | Noted: 2019-12-11 | Resolved: 2021-09-07

## 2021-09-07 PROBLEM — R61 SWEATING PROFUSELY: Status: RESOLVED | Noted: 2020-09-01 | Resolved: 2021-09-07

## 2021-09-07 PROBLEM — L03.115 CELLULITIS OF RIGHT LOWER EXTREMITY: Status: RESOLVED | Noted: 2018-01-02 | Resolved: 2021-09-07

## 2021-09-07 LAB
ALBUMIN SERPL-MCNC: 3.4 G/DL (ref 3.4–5)
ALBUMIN/GLOB SERPL: 0.8 {RATIO} (ref 1–2)
ALP LIVER SERPL-CCNC: 99 U/L
ALT SERPL-CCNC: 23 U/L
ANION GAP SERPL CALC-SCNC: 2 MMOL/L (ref 0–18)
AST SERPL-CCNC: 17 U/L (ref 15–37)
BASOPHILS # BLD AUTO: 0.05 X10(3) UL (ref 0–0.2)
BASOPHILS NFR BLD AUTO: 0.4 %
BILIRUB SERPL-MCNC: 0.5 MG/DL (ref 0.1–2)
BILIRUB UR QL STRIP.AUTO: NEGATIVE
BUN BLD-MCNC: 19 MG/DL (ref 7–18)
CALCIUM BLD-MCNC: 8.9 MG/DL (ref 8.5–10.1)
CHLORIDE SERPL-SCNC: 107 MMOL/L (ref 98–112)
CHOLEST SMN-MCNC: 158 MG/DL (ref ?–200)
CO2 SERPL-SCNC: 29 MMOL/L (ref 21–32)
COLOR UR AUTO: YELLOW
CREAT BLD-MCNC: 0.79 MG/DL
EOSINOPHIL # BLD AUTO: 0.15 X10(3) UL (ref 0–0.7)
EOSINOPHIL NFR BLD AUTO: 1.3 %
ERYTHROCYTE [DISTWIDTH] IN BLOOD BY AUTOMATED COUNT: 14.1 %
GLOBULIN PLAS-MCNC: 4.1 G/DL (ref 2.8–4.4)
GLUCOSE BLD-MCNC: 77 MG/DL (ref 70–99)
GLUCOSE UR STRIP.AUTO-MCNC: NEGATIVE MG/DL
HCT VFR BLD AUTO: 42.3 %
HDLC SERPL-MCNC: 66 MG/DL (ref 40–59)
HGB BLD-MCNC: 13.8 G/DL
HYALINE CASTS #/AREA URNS AUTO: PRESENT /LPF
IMM GRANULOCYTES # BLD AUTO: 0.03 X10(3) UL (ref 0–1)
IMM GRANULOCYTES NFR BLD: 0.3 %
KETONES UR STRIP.AUTO-MCNC: NEGATIVE MG/DL
LDLC SERPL CALC-MCNC: 78 MG/DL (ref ?–100)
LYMPHOCYTES # BLD AUTO: 1.47 X10(3) UL (ref 1–4)
LYMPHOCYTES NFR BLD AUTO: 13.2 %
M PROTEIN MFR SERPL ELPH: 7.5 G/DL (ref 6.4–8.2)
MCH RBC QN AUTO: 31 PG (ref 26–34)
MCHC RBC AUTO-ENTMCNC: 32.6 G/DL (ref 31–37)
MCV RBC AUTO: 95.1 FL
MONOCYTES # BLD AUTO: 1.05 X10(3) UL (ref 0.1–1)
MONOCYTES NFR BLD AUTO: 9.4 %
NEUTROPHILS # BLD AUTO: 8.37 X10 (3) UL (ref 1.5–7.7)
NEUTROPHILS # BLD AUTO: 8.37 X10(3) UL (ref 1.5–7.7)
NEUTROPHILS NFR BLD AUTO: 75.4 %
NITRITE UR QL STRIP.AUTO: NEGATIVE
NONHDLC SERPL-MCNC: 92 MG/DL (ref ?–130)
OSMOLALITY SERPL CALC.SUM OF ELEC: 287 MOSM/KG (ref 275–295)
PATIENT FASTING Y/N/NP: NO
PATIENT FASTING Y/N/NP: NO
PH UR STRIP.AUTO: 5 [PH] (ref 5–8)
PLATELET # BLD AUTO: 255 10(3)UL (ref 150–450)
POTASSIUM SERPL-SCNC: 4.9 MMOL/L (ref 3.5–5.1)
PROT UR STRIP.AUTO-MCNC: NEGATIVE MG/DL
RBC # BLD AUTO: 4.45 X10(6)UL
RBC UR QL AUTO: NEGATIVE
SODIUM SERPL-SCNC: 138 MMOL/L (ref 136–145)
SP GR UR STRIP.AUTO: 1.02 (ref 1–1.03)
TRIGL SERPL-MCNC: 72 MG/DL (ref 30–149)
TSI SER-ACNC: 1.21 MIU/ML (ref 0.36–3.74)
UROBILINOGEN UR STRIP.AUTO-MCNC: <2 MG/DL
VLDLC SERPL CALC-MCNC: 11 MG/DL (ref 0–30)
WBC # BLD AUTO: 11.1 X10(3) UL (ref 4–11)

## 2021-09-07 PROCEDURE — 80053 COMPREHEN METABOLIC PANEL: CPT

## 2021-09-07 PROCEDURE — 87086 URINE CULTURE/COLONY COUNT: CPT

## 2021-09-07 PROCEDURE — 85025 COMPLETE CBC W/AUTO DIFF WBC: CPT

## 2021-09-07 PROCEDURE — 99213 OFFICE O/P EST LOW 20 MIN: CPT | Performed by: FAMILY MEDICINE

## 2021-09-07 PROCEDURE — G0439 PPPS, SUBSEQ VISIT: HCPCS | Performed by: FAMILY MEDICINE

## 2021-09-07 PROCEDURE — 84443 ASSAY THYROID STIM HORMONE: CPT

## 2021-09-07 PROCEDURE — 80061 LIPID PANEL: CPT

## 2021-09-07 PROCEDURE — 36415 COLL VENOUS BLD VENIPUNCTURE: CPT

## 2021-09-07 PROCEDURE — 81001 URINALYSIS AUTO W/SCOPE: CPT

## 2021-09-07 RX ORDER — BIOTIN 1 MG
1 TABLET ORAL DAILY
COMMUNITY

## 2021-09-07 RX ORDER — ESCITALOPRAM OXALATE 5 MG/1
5 TABLET ORAL DAILY
Refills: 1 | COMMUNITY
Start: 2021-09-07 | End: 2022-02-04

## 2021-09-07 NOTE — PROGRESS NOTES
REASON FOR VISIT:    Marylu Scott is a 80year old female who presents for a Medicare Annual Wellness visit. She reports that she has pretty much given up driving.   She said she did when she noticed that she was having difficulty feeling her feet Cholecalciferol (VITAMIN D3) 25 MCG (1000 UT) Oral Cap Take 1 tablet by mouth daily. • escitalopram 5 MG Oral Tab Take 1 tablet (5 mg total) by mouth daily.   1   • BISOPROLOL FUMARATE 5 MG Oral Tab TAKE ONE TABLET BY MOUTH EVERY DAY 90 tablet 1   • FUR Units 9/7/2021 9/1/2020 12/11/2019 8/9/2019 6/26/2018   TSH 0.358 - 3.740 mIU/mL 1.210 1.080 0.877 1.290 1.340        General Health      In the past six months, have you lost more than 10 pounds without trying?: 2 - No  Has your appetite been poor?: No  T results found for: A1C    Fasting Blood Sugar (FSB)Annually Glucose (mg/dL)   Date Value   09/07/2021 77      Cardiovascular Disease Screening    LDL Annually LDL Cholesterol (mg/dL)   Date Value   09/07/2021 78       EKG - w/ Initial Preventative Physical (98307)                          11/15/2019      FLUZONE 6 months and older PFS 0.5 ml (41755)                          09/22/2018      Pneumococcal (Prevnar 13)                          08/09/2019      Pneumovax 23          11/03/2014      TDAP tender, FROM of the back  EXTREMITIES: Venous stasis edema is significantly improved with support stockings. She has had far less cellulitis of the lower legs.   NEURO: Oriented times three, cranial nerves are intact, motor and sensory are grossly intact LIPID PANEL; Future  - COMP METABOLIC PANEL (14); Future  - TSH W REFLEX TO FREE T4; Future  - CBC WITH DIFFERENTIAL WITH PLATELET; Future    7. Benign essential hypertension  Blood pressure control has been excellent.   No changes in blood pressure medicin History of bilateral cataract extraction  She has had both cataracts removed. - OFFICE/OUTPT VISIT,EST,LEVL III    20. Anxiety  Anxiety levels are well controlled now. No new medication recommended. - OFFICE/OUTPT VISIT,EST,LEVL III    21.  History of br

## 2021-09-07 NOTE — PATIENT INSTRUCTIONS
Decrease Escitalopram to 5 mg daily. Check labs today. Please consider the Covid vaccine. Recommended Websites for Advanced Directives    [email or web address]  An information packet, including necessary form from

## 2021-09-08 ENCOUNTER — TELEPHONE (OUTPATIENT)
Dept: FAMILY MEDICINE CLINIC | Facility: CLINIC | Age: 86
End: 2021-09-08

## 2021-09-08 NOTE — TELEPHONE ENCOUNTER
----- Message from Anusha Gallagher MD sent at 9/8/2021 12:40 PM CDT -----  The urinalysis is negative for infection. Urine culture is negative. There is not appear to be a urinary tract infection. Cholesterol is normal at 158.   Thyroid is normal.  Blood

## 2021-09-08 NOTE — TELEPHONE ENCOUNTER
Patient informed of below. Expressed understanding.   Melissa Ulrich, [facility], 09/08/21, 2:20 PM

## 2021-09-14 RX ORDER — POTASSIUM CHLORIDE 750 MG/1
TABLET, EXTENDED RELEASE ORAL
Qty: 90 TABLET | Refills: 1 | Status: SHIPPED | OUTPATIENT
Start: 2021-09-14

## 2021-09-14 NOTE — TELEPHONE ENCOUNTER
Future appt:    Last Appointment:  9/7/2021  Cholesterol, Total (mg/dL)   Date Value   09/07/2021 158     HDL Cholesterol (mg/dL)   Date Value   09/07/2021 66 (H)     LDL Cholesterol (mg/dL)   Date Value   09/07/2021 78     Triglycerides (mg/dL)   Date Daily

## 2021-09-20 ENCOUNTER — TELEPHONE (OUTPATIENT)
Dept: FAMILY MEDICINE CLINIC | Facility: CLINIC | Age: 86
End: 2021-09-20

## 2021-09-20 NOTE — TELEPHONE ENCOUNTER
Patient is complaining of right right/shoulder pain for the last couple of days. Able to lift arm. She has been taking tylenol every 6 hours with no relief. Not using any heat or ice on the area. No injury that she knows of.  Unable to take anything else fo

## 2021-09-20 NOTE — TELEPHONE ENCOUNTER
Continue to take Tylenol. Use Lidocaine 4% patches on area to help with pain. Use either ice or heat to help with pain. Rub Aspercream or Bengay on the area.

## 2021-09-20 NOTE — TELEPHONE ENCOUNTER
Pt c/o right arm and shoulder pain, does not remember injuring it, having trouble sleeping.  Pt offered appt but would like to talk to nurse first.

## 2021-09-21 ENCOUNTER — OFFICE VISIT (OUTPATIENT)
Dept: FAMILY MEDICINE CLINIC | Facility: CLINIC | Age: 86
End: 2021-09-21
Payer: MEDICARE

## 2021-09-21 VITALS
BODY MASS INDEX: 32 KG/M2 | DIASTOLIC BLOOD PRESSURE: 78 MMHG | WEIGHT: 176.38 LBS | HEART RATE: 72 BPM | SYSTOLIC BLOOD PRESSURE: 122 MMHG | TEMPERATURE: 99 F | OXYGEN SATURATION: 95 % | RESPIRATION RATE: 17 BRPM

## 2021-09-21 DIAGNOSIS — J41.0 SIMPLE CHRONIC BRONCHITIS (HCC): ICD-10-CM

## 2021-09-21 DIAGNOSIS — S43.411A SPRAIN OF RIGHT CORACOHUMERAL LIGAMENT, INITIAL ENCOUNTER: Primary | ICD-10-CM

## 2021-09-21 PROCEDURE — 99213 OFFICE O/P EST LOW 20 MIN: CPT | Performed by: FAMILY MEDICINE

## 2021-09-21 NOTE — PROGRESS NOTES
[address]  PROGRESS NOTE  Chief Complaint:   Patient presents with:  Shoulder Pain: symptom started approx. 4-5 days ago, denies any injury      HPI:   This is a 80year old female coming in for pain in her right shoulder.   She said it Total 0.5 0.1 - 2.0 mg/dL    Total Protein 7.5 6.4 - 8.2 g/dL    Albumin 3.4 3.4 - 5.0 g/dL    Globulin  4.1 2.8 - 4.4 g/dL    A/G Ratio 0.8 (L) 1.0 - 2.0    FASTING No    URINALYSIS, ROUTINE   Result Value Ref Range    Urine Color Yellow Yellow    Clarity 0.4 %    Immature Granulocyte % 0.3 %       Past Medical History:   Diagnosis Date   • Benign essential hypertension 5/8/2012   • Edema 5/8/2012   • History of DVT (deep vein thrombosis) 5/8/2012   • Low back pain 8/16/2016   • Malignant neoplasm of breast Multiple Vitamin (MULTIVITAMINS) Oral Cap Take 1 capsule by mouth daily. • Calcium 600-200 MG-UNIT Oral Tab Take 1 tablet by mouth daily.         Counseling given: Not Answered       REVIEW OF SYSTEMS:   CONSTITUTIONAL:  Denies unusual weight gain/loss, No deficit, normal gait, strength and tone, sensory intact. ASSESSMENT AND PLAN:   1. Sprain of right coracohumeral ligament, initial encounter  She has pain in the right shoulder this most consistent with a sprain of her right coracohumeral ligament.

## 2021-12-27 DIAGNOSIS — I10 BENIGN ESSENTIAL HYPERTENSION: ICD-10-CM

## 2021-12-27 RX ORDER — BISOPROLOL FUMARATE 5 MG/1
TABLET ORAL
Qty: 90 TABLET | Refills: 1 | Status: SHIPPED | OUTPATIENT
Start: 2021-12-27

## 2021-12-27 RX ORDER — FUROSEMIDE 20 MG/1
TABLET ORAL
Qty: 180 TABLET | Refills: 1 | Status: SHIPPED | OUTPATIENT
Start: 2021-12-27

## 2021-12-27 NOTE — TELEPHONE ENCOUNTER
Future appt:     Your appointments     Date & Time Appointment Department [facility])    Dec 29, 2021 10:20 AM CST Post Op Visit with Jessica Avednano MD 25 [facility], [facility]            [address] [place] [address]

## 2021-12-29 ENCOUNTER — LAB ENCOUNTER (OUTPATIENT)
Dept: LAB | Age: 86
End: 2021-12-29
Attending: FAMILY MEDICINE
Payer: MEDICARE

## 2021-12-29 ENCOUNTER — OFFICE VISIT (OUTPATIENT)
Dept: FAMILY MEDICINE CLINIC | Facility: CLINIC | Age: 86
End: 2021-12-29
Payer: MEDICARE

## 2021-12-29 VITALS
TEMPERATURE: 98 F | RESPIRATION RATE: 16 BRPM | HEART RATE: 68 BPM | BODY MASS INDEX: 32.91 KG/M2 | HEIGHT: 62 IN | SYSTOLIC BLOOD PRESSURE: 142 MMHG | DIASTOLIC BLOOD PRESSURE: 78 MMHG | WEIGHT: 178.81 LBS

## 2021-12-29 DIAGNOSIS — I10 BENIGN ESSENTIAL HYPERTENSION: ICD-10-CM

## 2021-12-29 DIAGNOSIS — H02.035 SENILE ENTROPION OF LEFT LOWER EYELID: ICD-10-CM

## 2021-12-29 DIAGNOSIS — Z86.718 HISTORY OF DVT (DEEP VEIN THROMBOSIS): ICD-10-CM

## 2021-12-29 DIAGNOSIS — Z01.818 PREOPERATIVE EXAMINATION: Primary | ICD-10-CM

## 2021-12-29 PROCEDURE — 87186 SC STD MICRODIL/AGAR DIL: CPT | Performed by: FAMILY MEDICINE

## 2021-12-29 PROCEDURE — 87086 URINE CULTURE/COLONY COUNT: CPT | Performed by: FAMILY MEDICINE

## 2021-12-29 PROCEDURE — 85025 COMPLETE CBC W/AUTO DIFF WBC: CPT | Performed by: FAMILY MEDICINE

## 2021-12-29 PROCEDURE — 80053 COMPREHEN METABOLIC PANEL: CPT | Performed by: FAMILY MEDICINE

## 2021-12-29 PROCEDURE — 36415 COLL VENOUS BLD VENIPUNCTURE: CPT | Performed by: FAMILY MEDICINE

## 2021-12-29 PROCEDURE — 87088 URINE BACTERIA CULTURE: CPT | Performed by: FAMILY MEDICINE

## 2021-12-29 PROCEDURE — 81001 URINALYSIS AUTO W/SCOPE: CPT | Performed by: FAMILY MEDICINE

## 2021-12-29 PROCEDURE — 99214 OFFICE O/P EST MOD 30 MIN: CPT | Performed by: FAMILY MEDICINE

## 2021-12-29 PROCEDURE — 93000 ELECTROCARDIOGRAM COMPLETE: CPT | Performed by: FAMILY MEDICINE

## 2021-12-29 RX ORDER — MOXIFLOXACIN 5 MG/ML
1 SOLUTION/ DROPS OPHTHALMIC AS DIRECTED
COMMUNITY
Start: 2021-12-03

## 2021-12-29 RX ORDER — NEOMYCIN SULFATE, POLYMYXIN B SULFATE AND DEXAMETHASONE 3.5; 10000; 1 MG/ML; [USP'U]/ML; MG/ML
SUSPENSION/ DROPS OPHTHALMIC AS DIRECTED
COMMUNITY
Start: 2021-12-15

## 2021-12-29 NOTE — PROGRESS NOTES
[facility]  PRE-OP NOTE    Chief Complaint:   Patient presents with:  Pre-Op: Coby/Julio César      HPI:   Romeo Shepard is a 80year old female with a hx of hypertension, history of DVT and senile entropion of left lower eyelid, who pre CR TAKE ONE TABLET BY MOUTH EVERY DAY 90 tablet 1   • Cholecalciferol (VITAMIN D3) 25 MCG (1000 UT) Oral Cap Take 1 tablet by mouth daily. • escitalopram 5 MG Oral Tab Take 1 tablet (5 mg total) by mouth daily.   1   • SYMBICORT 160-4.5 MCG/ACT Inhalati headache, seizures, dizziness, syncope, paralysis, ataxia, numbness or tingling in the extremities,change in bowel or bladder control. HEMATOLOGIC:  Denies anemia, bleeding or bruising. LYMPHATICS:  Denies enlarged nodes or history of splenectomy.   PSYCH No edema, no cyanosis, no clubbing, FROM, 2+ dorsalis pedis pulses bilaterally. NEURO:  No deficit, normal gait, strength and tone, sensory intact, normal reflexes.   PSYCHIATRIC: Alert and oriented x 3; affect appropriate, no depressed mood or anxiety Problem List:  Patient Active Problem List:     History of DVT (deep vein thrombosis)     Edema     Benign essential hypertension     Low back pain     Obliteration of lymphatic vessel     Diastolic dysfunction     History of ovarian cancer     Chronic

## 2021-12-29 NOTE — PATIENT INSTRUCTIONS
EKG shows normal sinus rhythm. After today's assessment  patient is at optimum health for surgery and relatively at low risk. There are no contraindication for procedure.    Recommend to avoid any use of aleve, aspirin or ibuprofen 1 week before surgery

## 2021-12-30 ENCOUNTER — TELEPHONE (OUTPATIENT)
Dept: FAMILY MEDICINE CLINIC | Facility: CLINIC | Age: 86
End: 2021-12-30

## 2021-12-30 RX ORDER — CEPHALEXIN 500 MG/1
500 CAPSULE ORAL 3 TIMES DAILY
Qty: 30 CAPSULE | Refills: 0 | Status: SHIPPED | OUTPATIENT
Start: 2021-12-30 | End: 2022-01-09

## 2021-12-30 NOTE — TELEPHONE ENCOUNTER
Please inform patient that her urine analysis is suggestive of UTI. Recommend to start cephalexin 500 mg 3 times a day for 10 days. Other labs are okay. Please fax preop H&P, EKG and lab results to [employer].     I have sent prescription of cephalexi

## 2021-12-30 NOTE — TELEPHONE ENCOUNTER
Pt has been informed and understands    Daughter has got the meds already. The rest of the information has been faxed.     There is nothing further at this time

## 2022-01-03 ENCOUNTER — TELEPHONE (OUTPATIENT)
Dept: FAMILY MEDICINE CLINIC | Facility: CLINIC | Age: 87
End: 2022-01-03

## 2022-01-03 NOTE — TELEPHONE ENCOUNTER
----- Message from Erik Jacobs MD sent at 1/2/2022  2:23 PM CST -----  Please inform patient that her urine culture is positive for E coli infection   Recommend to finish the course of antibiotics. Return to clinic if any concerns.

## 2022-01-10 LAB — AMB EXT COVID-19 RESULT: DETECTED

## 2022-01-12 ENCOUNTER — TELEPHONE (OUTPATIENT)
Dept: FAMILY MEDICINE CLINIC | Facility: CLINIC | Age: 87
End: 2022-01-12

## 2022-01-12 NOTE — TELEPHONE ENCOUNTER
Phoned Cipriano Mccann at [facility] regarding   Rescheduling patient's eye surgery. Informed of below, that patient can be  Scheduled in 2 weeks. Cipriano Mccann will call patient to R/S Eye Surgery. Patient informed.   Diaz Iqbal CMA, 01/12/22, 2:50 PM

## 2022-01-12 NOTE — TELEPHONE ENCOUNTER
Patient informed of the below recommendations. Patient states her surgery was r/s for 2/2/2022. States because her preop physical will be more than 30 day prior to that, Augustin Capps is saying she needs another preop. Please advise.

## 2022-01-12 NOTE — TELEPHONE ENCOUNTER
Patient had PreOp Covid19 Testing done/ Positive. Eye Surgery Cancelled. Alethea Post states patient has no Covid19 Symptoms. Phoned Coby/Julio César--  [facility] states that patient will need to be cleared by PCP to reschedule Eye Surgery.     Please adv

## 2022-01-12 NOTE — TELEPHONE ENCOUNTER
Pt states that she was supposed to have surgery today but the covid test that she took on Monday came back + so surgery was canceled. Would like a call back.

## 2022-01-12 NOTE — TELEPHONE ENCOUNTER
Preoperative COVID testing was positive. She needs to remain in isolation for 5 days. If no symptoms at the end of 5 days, she can return to normal activities but she needs to wear a mask all the time when she is out in public.     She may reschedule he

## 2022-01-27 ENCOUNTER — TELEPHONE (OUTPATIENT)
Dept: FAMILY MEDICINE CLINIC | Facility: CLINIC | Age: 87
End: 2022-01-27

## 2022-01-27 NOTE — TELEPHONE ENCOUNTER
Pt is calling because she is confused about the next surgery for 2/2/22- rodriguez hansen, eyelid    States that she thought everything was good to go- but her surgery scheduler said it isnt because the pre op is over 33days old.     Does pt need to come back in

## 2022-01-27 NOTE — TELEPHONE ENCOUNTER
I have refax and informed pt that I have faxed the pre op to [facility]. Informed her that she did not need to come back in. She was very please and understands.     There is nothing further at this time,

## 2022-01-28 ENCOUNTER — TELEPHONE (OUTPATIENT)
Dept: FAMILY MEDICINE CLINIC | Facility: CLINIC | Age: 87
End: 2022-01-28

## 2022-01-28 NOTE — TELEPHONE ENCOUNTER
Just needs a note stating that she cleared from Covid. Surgery scheduled for 2/2/22  fax # [phone]  No future appointments.

## 2022-01-28 NOTE — TELEPHONE ENCOUNTER
Please advised Letter that Patient has completed  Quarantine and patient may proceed with  Cataract surgery.   Jacey Balderrama CMA, 01/28/22, 9:54 AM

## 2022-02-04 ENCOUNTER — TELEPHONE (OUTPATIENT)
Dept: FAMILY MEDICINE CLINIC | Facility: CLINIC | Age: 87
End: 2022-02-04

## 2022-02-04 RX ORDER — ESCITALOPRAM OXALATE 5 MG/1
5 TABLET ORAL DAILY
Qty: 90 TABLET | Refills: 1 | Status: SHIPPED | OUTPATIENT
Start: 2022-02-04

## 2022-02-04 NOTE — TELEPHONE ENCOUNTER
Please advise restart of Lexapro. Please note above. Patient's Son Hope Rivas did go over and check on  Patient today.   Armando Flores, ERIC, 02/04/22, 2:32 PM

## 2022-02-04 NOTE — TELEPHONE ENCOUNTER
Restart Lexapro 5 mg p.o. daily. I will send in a new prescription for escitalopram or Lexapro 5 mg daily.

## 2022-02-04 NOTE — TELEPHONE ENCOUNTER
Had eye surgery on 2/2/22. Pt's daughter was helping take care of pt but daughter left today and pt states she feels nervous. Was previously taking Lexapro but stopped about a month ago. Would like to know if she should start taking again. Only has 10 pills left in current bottle.

## 2022-02-10 ENCOUNTER — PATIENT OUTREACH (OUTPATIENT)
Dept: FAMILY MEDICINE CLINIC | Facility: CLINIC | Age: 87
End: 2022-02-10

## 2022-02-15 RX ORDER — BUDESONIDE AND FORMOTEROL FUMARATE DIHYDRATE 160; 4.5 UG/1; UG/1
AEROSOL RESPIRATORY (INHALATION)
Qty: 30.6 G | Refills: 5 | Status: SHIPPED | OUTPATIENT
Start: 2022-02-15

## 2022-02-15 NOTE — TELEPHONE ENCOUNTER
Symbicort: 3/10/21    Future appt: Your appointments     Date & Time Appointment Department [facility])    Sep 09, 2022 10:30 AM CDT Medicare Annual Well Visit with Shailesh Hansen MD 25 [facility], [facility])            25 [facility] [place]  [address] [phone]  [phone]        Last Appointment with provider:   9/21/2021  Last appointment at [facility]:  12/29/2021  Cholesterol, Total (mg/dL)   Date Value   09/07/2021 158     HDL Cholesterol (mg/dL)   Date Value   09/07/2021 66 (H)     LDL Cholesterol (mg/dL)   Date Value   09/07/2021 78     Triglycerides (mg/dL)   Date Value   09/07/2021 72     No results found for: EAG, A1C  Lab Results   Component Value Date    TSH 1.210 09/07/2021       No follow-ups on file.

## 2022-02-18 RX ORDER — POTASSIUM CHLORIDE 750 MG/1
TABLET, EXTENDED RELEASE ORAL
Qty: 90 TABLET | Refills: 1 | Status: SHIPPED | OUTPATIENT
Start: 2022-02-18

## 2022-02-18 NOTE — TELEPHONE ENCOUNTER
Potassium: 9/14/21    Future appt: Your appointments     Date & Time Appointment Department [facility])    Sep 09, 2022 10:30 AM CDT Medicare Annual Well Visit with [facility]ila [facility]            25 [facility] [place]  [address] [phone]  [phone]        Last Appointment with provider:   9/21/2021  Last appointment at [facility] [place]:  12/29/2021  Cholesterol, Total (mg/dL)   Date Value   09/07/2021 158     HDL Cholesterol (mg/dL)   Date Value   09/07/2021 66 (H)     LDL Cholesterol (mg/dL)   Date Value   09/07/2021 78     Triglycerides (mg/dL)   Date Value   09/07/2021 72     No results found for: EAG, A1C  Lab Results   Component Value Date    TSH 1.210 09/07/2021       No follow-ups on file.

## 2022-03-07 ENCOUNTER — TELEPHONE (OUTPATIENT)
Dept: FAMILY MEDICINE CLINIC | Facility: CLINIC | Age: 87
End: 2022-03-07

## 2022-03-07 NOTE — TELEPHONE ENCOUNTER
Patient states for the past 7-10 days she has been having increased left shoulder/arm pain. States no relief from otc pain relievers. States it is causing difficulty sleeping. Denies: Neck/Jaw/Chest Pain. Appt given tomorrow for evaluation of symptoms. Future appt: Your appointments     Date & Time Appointment Department [facility])    Mar 08, 2022  1:30 PM CST Exam - Established with Erin Hoyt, [address], [place] ([facility])        Sep 09, 2022 10:30 AM CDT Medicare Annual Well Visit with Hermila Pascual MD 25 [facility], [place] ([facility])            25 [facility] [place]  [address] [phone]  [phone]        Last Appointment with provider:   9/21/2021  Last appointment at [facility]:  12/29/2021  Cholesterol, Total (mg/dL)   Date Value   09/07/2021 158     HDL Cholesterol (mg/dL)   Date Value   09/07/2021 66 (H)     LDL Cholesterol (mg/dL)   Date Value   09/07/2021 78     Triglycerides (mg/dL)   Date Value   09/07/2021 72     No results found for: EAG, A1C  Lab Results   Component Value Date    TSH 1.210 09/07/2021       No follow-ups on file.

## 2022-03-08 ENCOUNTER — OFFICE VISIT (OUTPATIENT)
Dept: FAMILY MEDICINE CLINIC | Facility: CLINIC | Age: 87
End: 2022-03-08
Payer: MEDICARE

## 2022-03-08 VITALS
TEMPERATURE: 99 F | DIASTOLIC BLOOD PRESSURE: 74 MMHG | WEIGHT: 173.63 LBS | OXYGEN SATURATION: 96 % | RESPIRATION RATE: 20 BRPM | SYSTOLIC BLOOD PRESSURE: 132 MMHG | HEIGHT: 62 IN | BODY MASS INDEX: 31.95 KG/M2 | HEART RATE: 73 BPM

## 2022-03-08 DIAGNOSIS — M25.512 ACUTE PAIN OF LEFT SHOULDER: Primary | ICD-10-CM

## 2022-03-08 PROCEDURE — 99214 OFFICE O/P EST MOD 30 MIN: CPT | Performed by: NURSE PRACTITIONER

## 2022-03-08 RX ORDER — SUMATRIPTAN SUCCINATE 100 MG/1
TABLET ORAL
COMMUNITY
Start: 2022-01-06

## 2022-03-08 NOTE — PATIENT INSTRUCTIONS
Okay for tylenol arthritis twice a day at this time until improvement  Ice your left shoulder four times a day for 10-15 minutes at a time  Physical therapy through [facility]

## 2022-03-14 ENCOUNTER — TELEPHONE (OUTPATIENT)
Dept: FAMILY MEDICINE CLINIC | Facility: CLINIC | Age: 87
End: 2022-03-14

## 2022-03-14 NOTE — TELEPHONE ENCOUNTER
Parking Placard Form mailed to the   [facility].   Delores Moser CMA, 03/14/22, 11:22 AM

## 2022-08-05 RX ORDER — BISOPROLOL FUMARATE 5 MG/1
TABLET ORAL
Qty: 90 TABLET | Refills: 1 | Status: SHIPPED | OUTPATIENT
Start: 2022-08-05

## 2022-08-05 NOTE — TELEPHONE ENCOUNTER
Future appt: Your appointments     Date & Time Appointment Department [facility])    Sep 09, 2022 10:30 AM CDT Medicare Annual Well Visit with Griselda Pelaez MD 25 [facility] ([facility])            25 [facility] [phone]  [phone]        Last Appointment with provider:   Visit date not found  Last appointment at [facility]:  3/8/2022    Last refill 12/27/21 #90 with 1 refill      Cholesterol, Total (mg/dL)   Date Value   09/07/2021 158     HDL Cholesterol (mg/dL)   Date Value   09/07/2021 66 (H)     LDL Cholesterol (mg/dL)   Date Value   09/07/2021 78     Triglycerides (mg/dL)   Date Value   09/07/2021 72     No results found for: EAG, A1C  Lab Results   Component Value Date    TSH 1.210 09/07/2021       No follow-ups on file.

## 2022-09-07 RX ORDER — POTASSIUM CHLORIDE 750 MG/1
TABLET, EXTENDED RELEASE ORAL
Qty: 90 TABLET | Refills: 1 | Status: SHIPPED | OUTPATIENT
Start: 2022-09-07 | End: 2022-09-09

## 2022-09-07 NOTE — TELEPHONE ENCOUNTER
Future appt: Your appointments     Date & Time Appointment Department [facility])    Sep 09, 2022 10:30 AM CDT Medicare Annual Well Visit with Ellen Child MD 25 [facility], [place] ([facility])            25 [facility] [address] [phone]  [phone]        Last Appointment with provider:   Visit date not found  Last appointment at [facility]:  Visit date not found    Last refill 2/18/22 #90 with 1 refill       Cholesterol, Total (mg/dL)   Date Value   09/07/2021 158     HDL Cholesterol (mg/dL)   Date Value   09/07/2021 66 (H)     LDL Cholesterol (mg/dL)   Date Value   09/07/2021 78     Triglycerides (mg/dL)   Date Value   09/07/2021 72     No results found for: EAG, A1C  Lab Results   Component Value Date    TSH 1.210 09/07/2021       No follow-ups on file.

## 2022-09-09 ENCOUNTER — OFFICE VISIT (OUTPATIENT)
Dept: FAMILY MEDICINE CLINIC | Facility: CLINIC | Age: 87
End: 2022-09-09
Payer: MEDICARE

## 2022-09-09 ENCOUNTER — LAB ENCOUNTER (OUTPATIENT)
Dept: LAB | Age: 87
End: 2022-09-09
Attending: FAMILY MEDICINE
Payer: MEDICARE

## 2022-09-09 VITALS
TEMPERATURE: 99 F | BODY MASS INDEX: 32.2 KG/M2 | SYSTOLIC BLOOD PRESSURE: 122 MMHG | HEIGHT: 62 IN | DIASTOLIC BLOOD PRESSURE: 70 MMHG | WEIGHT: 175 LBS | HEART RATE: 84 BPM | RESPIRATION RATE: 16 BRPM

## 2022-09-09 DIAGNOSIS — M54.42 CHRONIC MIDLINE LOW BACK PAIN WITH BILATERAL SCIATICA: ICD-10-CM

## 2022-09-09 DIAGNOSIS — J41.0 SIMPLE CHRONIC BRONCHITIS (HCC): ICD-10-CM

## 2022-09-09 DIAGNOSIS — N81.10 PROLAPSE OF VAGINAL WALL: ICD-10-CM

## 2022-09-09 DIAGNOSIS — Z85.3 HISTORY OF BREAST CANCER: ICD-10-CM

## 2022-09-09 DIAGNOSIS — Z86.718 HISTORY OF DVT (DEEP VEIN THROMBOSIS): ICD-10-CM

## 2022-09-09 DIAGNOSIS — J30.1 CHRONIC SEASONAL ALLERGIC RHINITIS DUE TO POLLEN: ICD-10-CM

## 2022-09-09 DIAGNOSIS — L03.119 RECURRENT CELLULITIS OF LOWER EXTREMITY: ICD-10-CM

## 2022-09-09 DIAGNOSIS — R60.9 EDEMA, UNSPECIFIED TYPE: ICD-10-CM

## 2022-09-09 DIAGNOSIS — G89.29 CHRONIC MIDLINE LOW BACK PAIN WITH BILATERAL SCIATICA: ICD-10-CM

## 2022-09-09 DIAGNOSIS — M54.41 CHRONIC MIDLINE LOW BACK PAIN WITH BILATERAL SCIATICA: ICD-10-CM

## 2022-09-09 DIAGNOSIS — R30.0 DYSURIA: ICD-10-CM

## 2022-09-09 DIAGNOSIS — F32.0 CURRENT MILD EPISODE OF MAJOR DEPRESSIVE DISORDER WITHOUT PRIOR EPISODE (HCC): ICD-10-CM

## 2022-09-09 DIAGNOSIS — N39.46 MIXED STRESS AND URGE URINARY INCONTINENCE: ICD-10-CM

## 2022-09-09 DIAGNOSIS — I51.89 DIASTOLIC DYSFUNCTION: ICD-10-CM

## 2022-09-09 DIAGNOSIS — Z13.6 SCREENING FOR CARDIOVASCULAR CONDITION: ICD-10-CM

## 2022-09-09 DIAGNOSIS — F41.9 ANXIETY: ICD-10-CM

## 2022-09-09 DIAGNOSIS — I87.2 VENOUS STASIS DERMATITIS OF BOTH LOWER EXTREMITIES: ICD-10-CM

## 2022-09-09 DIAGNOSIS — I10 BENIGN ESSENTIAL HYPERTENSION: ICD-10-CM

## 2022-09-09 DIAGNOSIS — M15.9 PRIMARY OSTEOARTHRITIS INVOLVING MULTIPLE JOINTS: ICD-10-CM

## 2022-09-09 DIAGNOSIS — H18.503 HEREDITARY DYSTROPHY OF BOTH CORNEAS: ICD-10-CM

## 2022-09-09 DIAGNOSIS — I89.0 OBLITERATION OF LYMPHATIC VESSEL: ICD-10-CM

## 2022-09-09 DIAGNOSIS — H61.23 BILATERAL IMPACTED CERUMEN: ICD-10-CM

## 2022-09-09 DIAGNOSIS — H90.6 MIXED CONDUCTIVE AND SENSORINEURAL HEARING LOSS OF BOTH EARS: ICD-10-CM

## 2022-09-09 DIAGNOSIS — Z00.00 ENCOUNTER FOR ANNUAL HEALTH EXAMINATION: Primary | ICD-10-CM

## 2022-09-09 DIAGNOSIS — D68.51 HETEROZYGOUS FACTOR V LEIDEN MUTATION (HCC): ICD-10-CM

## 2022-09-09 DIAGNOSIS — Z85.43 HISTORY OF OVARIAN CANCER: ICD-10-CM

## 2022-09-09 PROBLEM — Z98.42 HISTORY OF BILATERAL CATARACT EXTRACTION: Status: RESOLVED | Noted: 2019-08-09 | Resolved: 2022-09-09

## 2022-09-09 PROBLEM — H18.509 HEREDITARY CORNEAL DYSTROPHY: Status: ACTIVE | Noted: 2017-10-12

## 2022-09-09 PROBLEM — S43.411A SPRAIN OF RIGHT CORACOHUMERAL LIGAMENT: Status: RESOLVED | Noted: 2021-09-21 | Resolved: 2022-09-09

## 2022-09-09 PROBLEM — Z98.41 HISTORY OF BILATERAL CATARACT EXTRACTION: Status: RESOLVED | Noted: 2019-08-09 | Resolved: 2022-09-09

## 2022-09-09 PROBLEM — H02.035 SENILE ENTROPION OF LEFT LOWER EYELID: Status: RESOLVED | Noted: 2021-12-01 | Resolved: 2022-09-09

## 2022-09-09 LAB
ALBUMIN SERPL-MCNC: 3.2 G/DL (ref 3.4–5)
ALBUMIN/GLOB SERPL: 0.7 {RATIO} (ref 1–2)
ALP LIVER SERPL-CCNC: 107 U/L
ALT SERPL-CCNC: 24 U/L
ANION GAP SERPL CALC-SCNC: 6 MMOL/L (ref 0–18)
AST SERPL-CCNC: 18 U/L (ref 15–37)
BASOPHILS # BLD AUTO: 0.04 X10(3) UL (ref 0–0.2)
BASOPHILS NFR BLD AUTO: 0.4 %
BILIRUB SERPL-MCNC: 0.6 MG/DL (ref 0.1–2)
BILIRUB UR QL STRIP.AUTO: NEGATIVE
BUN BLD-MCNC: 19 MG/DL (ref 7–18)
CALCIUM BLD-MCNC: 9.1 MG/DL (ref 8.5–10.1)
CHLORIDE SERPL-SCNC: 108 MMOL/L (ref 98–112)
CHOLEST SERPL-MCNC: 141 MG/DL (ref ?–200)
CO2 SERPL-SCNC: 22 MMOL/L (ref 21–32)
COLOR UR AUTO: YELLOW
CREAT BLD-MCNC: 0.8 MG/DL
EOSINOPHIL # BLD AUTO: 0.24 X10(3) UL (ref 0–0.7)
EOSINOPHIL NFR BLD AUTO: 2.4 %
ERYTHROCYTE [DISTWIDTH] IN BLOOD BY AUTOMATED COUNT: 14 %
FASTING PATIENT LIPID ANSWER: NO
FASTING STATUS PATIENT QL REPORTED: NO
GFR SERPLBLD BASED ON 1.73 SQ M-ARVRAT: 71 ML/MIN/1.73M2 (ref 60–?)
GLOBULIN PLAS-MCNC: 4.3 G/DL (ref 2.8–4.4)
GLUCOSE BLD-MCNC: 96 MG/DL (ref 70–99)
GLUCOSE UR STRIP.AUTO-MCNC: NEGATIVE MG/DL
HCT VFR BLD AUTO: 42.8 %
HDLC SERPL-MCNC: 61 MG/DL (ref 40–59)
HGB BLD-MCNC: 13.7 G/DL
IMM GRANULOCYTES # BLD AUTO: 0.04 X10(3) UL (ref 0–1)
IMM GRANULOCYTES NFR BLD: 0.4 %
KETONES UR STRIP.AUTO-MCNC: NEGATIVE MG/DL
LDLC SERPL CALC-MCNC: 64 MG/DL (ref ?–100)
LYMPHOCYTES # BLD AUTO: 1.8 X10(3) UL (ref 1–4)
LYMPHOCYTES NFR BLD AUTO: 17.9 %
MCH RBC QN AUTO: 31.4 PG (ref 26–34)
MCHC RBC AUTO-ENTMCNC: 32 G/DL (ref 31–37)
MCV RBC AUTO: 98.2 FL
MONOCYTES # BLD AUTO: 1.03 X10(3) UL (ref 0.1–1)
MONOCYTES NFR BLD AUTO: 10.2 %
NEUTROPHILS # BLD AUTO: 6.93 X10 (3) UL (ref 1.5–7.7)
NEUTROPHILS # BLD AUTO: 6.93 X10(3) UL (ref 1.5–7.7)
NEUTROPHILS NFR BLD AUTO: 68.7 %
NITRITE UR QL STRIP.AUTO: POSITIVE
NONHDLC SERPL-MCNC: 80 MG/DL (ref ?–130)
OSMOLALITY SERPL CALC.SUM OF ELEC: 284 MOSM/KG (ref 275–295)
PH UR STRIP.AUTO: 6 [PH] (ref 5–8)
PLATELET # BLD AUTO: 243 10(3)UL (ref 150–450)
POTASSIUM SERPL-SCNC: 4.4 MMOL/L (ref 3.5–5.1)
PROT SERPL-MCNC: 7.5 G/DL (ref 6.4–8.2)
PROT UR STRIP.AUTO-MCNC: NEGATIVE MG/DL
RBC # BLD AUTO: 4.36 X10(6)UL
SODIUM SERPL-SCNC: 136 MMOL/L (ref 136–145)
SP GR UR STRIP.AUTO: 1.02 (ref 1–1.03)
TRIGL SERPL-MCNC: 82 MG/DL (ref 30–149)
TSI SER-ACNC: 1.41 MIU/ML (ref 0.36–3.74)
UROBILINOGEN UR STRIP.AUTO-MCNC: 0.2 MG/DL
VLDLC SERPL CALC-MCNC: 12 MG/DL (ref 0–30)
WBC # BLD AUTO: 10.1 X10(3) UL (ref 4–11)
WBC #/AREA URNS AUTO: >50 /HPF
WBC #/AREA URNS AUTO: >50 /HPF
WBC CLUMPS UR QL AUTO: PRESENT /HPF
WBC CLUMPS UR QL AUTO: PRESENT /HPF

## 2022-09-09 PROCEDURE — 84443 ASSAY THYROID STIM HORMONE: CPT

## 2022-09-09 PROCEDURE — 81001 URINALYSIS AUTO W/SCOPE: CPT

## 2022-09-09 PROCEDURE — 1125F AMNT PAIN NOTED PAIN PRSNT: CPT | Performed by: FAMILY MEDICINE

## 2022-09-09 PROCEDURE — 80053 COMPREHEN METABOLIC PANEL: CPT

## 2022-09-09 PROCEDURE — 99214 OFFICE O/P EST MOD 30 MIN: CPT | Performed by: FAMILY MEDICINE

## 2022-09-09 PROCEDURE — 81015 MICROSCOPIC EXAM OF URINE: CPT

## 2022-09-09 PROCEDURE — G0439 PPPS, SUBSEQ VISIT: HCPCS | Performed by: FAMILY MEDICINE

## 2022-09-09 PROCEDURE — 36415 COLL VENOUS BLD VENIPUNCTURE: CPT

## 2022-09-09 PROCEDURE — 80061 LIPID PANEL: CPT

## 2022-09-09 PROCEDURE — 85025 COMPLETE CBC W/AUTO DIFF WBC: CPT

## 2022-09-09 RX ORDER — BUDESONIDE AND FORMOTEROL FUMARATE DIHYDRATE 160; 4.5 UG/1; UG/1
2 AEROSOL RESPIRATORY (INHALATION) 2 TIMES DAILY
Qty: 30.6 G | Refills: 3 | Status: SHIPPED | OUTPATIENT
Start: 2022-09-09

## 2022-09-09 RX ORDER — BISOPROLOL FUMARATE 5 MG/1
5 TABLET ORAL DAILY
Qty: 90 TABLET | Refills: 3 | Status: SHIPPED | OUTPATIENT
Start: 2022-09-09

## 2022-09-09 RX ORDER — POTASSIUM CHLORIDE 750 MG/1
10 TABLET, EXTENDED RELEASE ORAL DAILY
Qty: 90 TABLET | Refills: 3 | Status: SHIPPED | OUTPATIENT
Start: 2022-09-09

## 2022-09-09 RX ORDER — FUROSEMIDE 20 MG/1
20 TABLET ORAL 2 TIMES DAILY
Qty: 180 TABLET | Refills: 3 | Status: SHIPPED | OUTPATIENT
Start: 2022-09-09

## 2022-09-10 ENCOUNTER — TELEPHONE (OUTPATIENT)
Dept: FAMILY MEDICINE CLINIC | Facility: CLINIC | Age: 87
End: 2022-09-10

## 2022-09-10 RX ORDER — NITROFURANTOIN 25; 75 MG/1; MG/1
100 CAPSULE ORAL 2 TIMES DAILY
Qty: 20 CAPSULE | Refills: 0 | Status: SHIPPED | OUTPATIENT
Start: 2022-09-10 | End: 2022-09-20

## 2022-09-10 NOTE — TELEPHONE ENCOUNTER
Patient did have UTI symptoms yesterday at  Time of visit. UA was done to confirm UTI.     Please advise treatment for the weekend/ [facility]

## 2022-09-12 ENCOUNTER — TELEPHONE (OUTPATIENT)
Dept: FAMILY MEDICINE CLINIC | Facility: CLINIC | Age: 87
End: 2022-09-12

## 2022-09-12 NOTE — TELEPHONE ENCOUNTER
----- Message from Nisa Noonan MD sent at 9/12/2022  9:05 AM CDT -----  The urinalysis is abnormal and shows definite evidence of a urinary tract infection. CBC is normal.  Hemoglobin is normal.  Thyroid is normal.  Blood sugar is normal.  Kidney function is excellent. Cholesterol is good at 141. Impression: Overall the labs were very good. The urinary tract infection is the only significant abnormality. Plan: Continue the Macrodantin as prescribed on Saturday.

## 2022-10-13 ENCOUNTER — OFFICE VISIT (OUTPATIENT)
Dept: FAMILY MEDICINE CLINIC | Facility: CLINIC | Age: 87
End: 2022-10-13
Payer: MEDICARE

## 2022-10-13 ENCOUNTER — LAB ENCOUNTER (OUTPATIENT)
Dept: LAB | Age: 87
End: 2022-10-13
Attending: NURSE PRACTITIONER
Payer: MEDICARE

## 2022-10-13 VITALS
DIASTOLIC BLOOD PRESSURE: 76 MMHG | WEIGHT: 177 LBS | HEIGHT: 62 IN | SYSTOLIC BLOOD PRESSURE: 126 MMHG | TEMPERATURE: 98 F | RESPIRATION RATE: 18 BRPM | BODY MASS INDEX: 32.57 KG/M2 | HEART RATE: 60 BPM | OXYGEN SATURATION: 96 %

## 2022-10-13 DIAGNOSIS — M79.674 TOE PAIN, RIGHT: ICD-10-CM

## 2022-10-13 DIAGNOSIS — M79.644 FINGER PAIN, RIGHT: Primary | ICD-10-CM

## 2022-10-13 DIAGNOSIS — M79.644 FINGER PAIN, RIGHT: ICD-10-CM

## 2022-10-13 LAB — URATE SERPL-MCNC: 4.6 MG/DL

## 2022-10-13 PROCEDURE — 84550 ASSAY OF BLOOD/URIC ACID: CPT

## 2022-10-13 PROCEDURE — 99213 OFFICE O/P EST LOW 20 MIN: CPT | Performed by: NURSE PRACTITIONER

## 2022-10-13 PROCEDURE — 36415 COLL VENOUS BLD VENIPUNCTURE: CPT

## 2022-10-13 NOTE — PATIENT INSTRUCTIONS
Uric acid today    Ice Right finger joint and right foot 3-4 times a day for 15-20 minutes at a time  Await test results

## 2023-01-30 ENCOUNTER — TELEPHONE (OUTPATIENT)
Dept: FAMILY MEDICINE CLINIC | Facility: CLINIC | Age: 88
End: 2023-01-30

## 2023-01-30 ENCOUNTER — HOSPITAL ENCOUNTER (OUTPATIENT)
Dept: GENERAL RADIOLOGY | Age: 88
Discharge: HOME | End: 2023-01-30
Attending: NURSE PRACTITIONER
Payer: MEDICARE

## 2023-01-30 ENCOUNTER — OFFICE VISIT (OUTPATIENT)
Dept: FAMILY MEDICINE CLINIC | Facility: CLINIC | Age: 88
End: 2023-01-30
Payer: MEDICARE

## 2023-01-30 VITALS
DIASTOLIC BLOOD PRESSURE: 84 MMHG | TEMPERATURE: 98 F | SYSTOLIC BLOOD PRESSURE: 142 MMHG | RESPIRATION RATE: 18 BRPM | HEIGHT: 62 IN | OXYGEN SATURATION: 97 % | WEIGHT: 180 LBS | HEART RATE: 76 BPM | BODY MASS INDEX: 33.13 KG/M2

## 2023-01-30 DIAGNOSIS — R05.1 ACUTE COUGH: Primary | ICD-10-CM

## 2023-01-30 DIAGNOSIS — R05.1 ACUTE COUGH: ICD-10-CM

## 2023-01-30 DIAGNOSIS — R04.2 HEMOPTYSIS: ICD-10-CM

## 2023-01-30 DIAGNOSIS — R35.0 FREQUENCY OF URINATION: ICD-10-CM

## 2023-01-30 LAB
APPEARANCE: YELLOW
BILIRUB UR QL STRIP.AUTO: NEGATIVE
BILIRUBIN: NEGATIVE
CLARITY UR REFRACT.AUTO: CLEAR
COLOR UR AUTO: YELLOW
GLUCOSE (URINE DIPSTICK): NEGATIVE MG/DL
GLUCOSE UR STRIP.AUTO-MCNC: NEGATIVE MG/DL
KETONES (URINE DIPSTICK): NEGATIVE MG/DL
KETONES UR STRIP.AUTO-MCNC: NEGATIVE MG/DL
MULTISTIX LOT#: ABNORMAL NUMERIC
NITRITE UR QL STRIP.AUTO: POSITIVE
NITRITE, URINE: POSITIVE
OCCULT BLOOD: NEGATIVE
PH UR STRIP.AUTO: 6 [PH] (ref 5–8)
PH, URINE: 7 (ref 4.5–8)
PROT UR STRIP.AUTO-MCNC: NEGATIVE MG/DL
PROTEIN (URINE DIPSTICK): NEGATIVE MG/DL
RBC UR QL AUTO: NEGATIVE
SP GR UR STRIP.AUTO: 1.01 (ref 1–1.03)
SPECIFIC GRAVITY: 1.01 (ref 1–1.03)
URINE-COLOR: CLEAR
UROBILINOGEN UR STRIP.AUTO-MCNC: 0.2 MG/DL
UROBILINOGEN,SEMI-QN: 0.2 MG/DL (ref 0–1.9)

## 2023-01-30 PROCEDURE — 87088 URINE BACTERIA CULTURE: CPT | Performed by: NURSE PRACTITIONER

## 2023-01-30 PROCEDURE — 99214 OFFICE O/P EST MOD 30 MIN: CPT | Performed by: NURSE PRACTITIONER

## 2023-01-30 PROCEDURE — 71046 X-RAY EXAM CHEST 2 VIEWS: CPT | Performed by: NURSE PRACTITIONER

## 2023-01-30 PROCEDURE — 81001 URINALYSIS AUTO W/SCOPE: CPT | Performed by: NURSE PRACTITIONER

## 2023-01-30 PROCEDURE — 81003 URINALYSIS AUTO W/O SCOPE: CPT | Performed by: NURSE PRACTITIONER

## 2023-01-30 PROCEDURE — 1126F AMNT PAIN NOTED NONE PRSNT: CPT | Performed by: NURSE PRACTITIONER

## 2023-01-30 PROCEDURE — 81015 MICROSCOPIC EXAM OF URINE: CPT | Performed by: NURSE PRACTITIONER

## 2023-01-30 PROCEDURE — 87186 SC STD MICRODIL/AGAR DIL: CPT | Performed by: NURSE PRACTITIONER

## 2023-01-30 PROCEDURE — 87086 URINE CULTURE/COLONY COUNT: CPT | Performed by: NURSE PRACTITIONER

## 2023-01-30 RX ORDER — AMOXICILLIN AND CLAVULANATE POTASSIUM 875; 125 MG/1; MG/1
1 TABLET, FILM COATED ORAL 2 TIMES DAILY
Qty: 20 TABLET | Refills: 0 | Status: SHIPPED | OUTPATIENT
Start: 2023-01-30 | End: 2023-02-09

## 2023-01-30 NOTE — PATIENT INSTRUCTIONS
Chest xray today    Urine sendout    Deep breathing exercises 10 times an hour    Augmentin 1 tablet twice a day for ten days for urine and chest coverage; take with food

## 2023-01-30 NOTE — TELEPHONE ENCOUNTER
Patient states she has had a cough for over  10 days. Family requesting patient to be seen. Patient also with frequent UTI's. Would like urine checked. Appointment given. Armando Flores CMA, 01/30/23, 9:27 AM    Future appt: Your appointments     Date & Time Appointment Department [facility])    Jan 30, 2023  3:30 PM CST Exam - Established with Orvel Dubin, APRN [facility], [address] [facility] [place] ([facility])            [address] [facility]  [address] [phone]  [phone]        Last Appointment with provider:   9/9/2022  Last appointment at [facility]:  10/13/2022  Cholesterol, Total (mg/dL)   Date Value   09/09/2022 141     HDL Cholesterol (mg/dL)   Date Value   09/09/2022 61 (H)     LDL Cholesterol (mg/dL)   Date Value   09/09/2022 64     Triglycerides (mg/dL)   Date Value   09/09/2022 82     No results found for: EAG, A1C  Lab Results   Component Value Date    TSH 1.410 09/09/2022       No follow-ups on file.

## 2023-01-31 ENCOUNTER — TELEPHONE (OUTPATIENT)
Dept: FAMILY MEDICINE CLINIC | Facility: CLINIC | Age: 88
End: 2023-01-31

## 2023-01-31 NOTE — TELEPHONE ENCOUNTER
Yes, it is possible if it's pressing on her diaphragm and could be contributing to chronic cough but I cannot guarantee that. She can consult yes with a provider though they would have to review intervention possibilities and risks as she is on chronic blood thinners with her medical history and age. If she wants to proceed with a consult to review I'd put in for Dr. Gregg Boss. May want to review with family additionally.

## 2023-01-31 NOTE — TELEPHONE ENCOUNTER
Pt is wanting to know if she needs to see someone for the hernia. Pt is also wondering if this could be contributing to the chronic cough that she has. Please advise.

## 2023-01-31 NOTE — TELEPHONE ENCOUNTER
Phone continuously rings. Unable to leave VM. Daughter in law notified and will speak with pt tonight.

## 2023-01-31 NOTE — TELEPHONE ENCOUNTER
----- Message from ALAYNA Haynes sent at 1/31/2023  8:02 AM CST -----  Please notify patient she has a large hiatial hernia with some pressing on the left diaphram.  Heart enlargement without noted fluid collection in the lungs. No definite acute pneumonia but the hernia is obscuring some portion of the view on the xray. Continue augmentin for sinus and chest coverage. Notify office if no improvement or worsening.

## 2023-02-01 NOTE — TELEPHONE ENCOUNTER
Pt called back. Pt informed of heathers recommendations and to talk with family in regards to making decision on referral.     No further questions or concerns at this time. Pt will call back if she wants to follow through with referral for hernia.

## 2023-06-28 ENCOUNTER — TELEPHONE (OUTPATIENT)
Dept: FAMILY MEDICINE CLINIC | Facility: CLINIC | Age: 88
End: 2023-06-28

## 2023-06-29 ENCOUNTER — LAB ENCOUNTER (OUTPATIENT)
Dept: LAB | Age: 88
End: 2023-06-29
Payer: MEDICARE

## 2023-06-29 ENCOUNTER — OFFICE VISIT (OUTPATIENT)
Dept: FAMILY MEDICINE CLINIC | Facility: CLINIC | Age: 88
End: 2023-06-29
Payer: MEDICARE

## 2023-06-29 VITALS
DIASTOLIC BLOOD PRESSURE: 84 MMHG | RESPIRATION RATE: 18 BRPM | HEART RATE: 71 BPM | SYSTOLIC BLOOD PRESSURE: 124 MMHG | BODY MASS INDEX: 33.38 KG/M2 | OXYGEN SATURATION: 99 % | WEIGHT: 181.38 LBS | HEIGHT: 62 IN | TEMPERATURE: 98 F

## 2023-06-29 DIAGNOSIS — M54.6 ACUTE LEFT-SIDED THORACIC BACK PAIN: ICD-10-CM

## 2023-06-29 DIAGNOSIS — R07.89 CHEST DISCOMFORT: ICD-10-CM

## 2023-06-29 DIAGNOSIS — R35.0 URINE FREQUENCY: ICD-10-CM

## 2023-06-29 DIAGNOSIS — M54.6 ACUTE LEFT-SIDED THORACIC BACK PAIN: Primary | ICD-10-CM

## 2023-06-29 LAB
ALBUMIN SERPL-MCNC: 3.5 G/DL (ref 3.4–5)
ALBUMIN/GLOB SERPL: 0.8 {RATIO} (ref 1–2)
ALP LIVER SERPL-CCNC: 105 U/L
ALT SERPL-CCNC: 29 U/L
ANION GAP SERPL CALC-SCNC: 4 MMOL/L (ref 0–18)
APPEARANCE: CLEAR
AST SERPL-CCNC: 26 U/L (ref 15–37)
BASOPHILS # BLD AUTO: 0.04 X10(3) UL (ref 0–0.2)
BASOPHILS NFR BLD AUTO: 0.4 %
BILIRUB SERPL-MCNC: 0.4 MG/DL (ref 0.1–2)
BILIRUB UR QL STRIP.AUTO: NEGATIVE
BILIRUBIN: NEGATIVE
BUN BLD-MCNC: 22 MG/DL (ref 7–18)
CALCIUM BLD-MCNC: 9.1 MG/DL (ref 8.5–10.1)
CHLORIDE SERPL-SCNC: 106 MMOL/L (ref 98–112)
CLARITY UR REFRACT.AUTO: CLEAR
CO2 SERPL-SCNC: 26 MMOL/L (ref 21–32)
COLOR UR AUTO: COLORLESS
CREAT BLD-MCNC: 0.94 MG/DL
EOSINOPHIL # BLD AUTO: 0.3 X10(3) UL (ref 0–0.7)
EOSINOPHIL NFR BLD AUTO: 3.1 %
ERYTHROCYTE [DISTWIDTH] IN BLOOD BY AUTOMATED COUNT: 14.2 %
FASTING STATUS PATIENT QL REPORTED: NO
GFR SERPLBLD BASED ON 1.73 SQ M-ARVRAT: 58 ML/MIN/1.73M2 (ref 60–?)
GLOBULIN PLAS-MCNC: 4.2 G/DL (ref 2.8–4.4)
GLUCOSE (URINE DIPSTICK): NEGATIVE MG/DL
GLUCOSE BLD-MCNC: 115 MG/DL (ref 70–99)
GLUCOSE UR STRIP.AUTO-MCNC: NEGATIVE MG/DL
HCT VFR BLD AUTO: 42.9 %
HGB BLD-MCNC: 14.1 G/DL
IMM GRANULOCYTES # BLD AUTO: 0.03 X10(3) UL (ref 0–1)
IMM GRANULOCYTES NFR BLD: 0.3 %
KETONES (URINE DIPSTICK): NEGATIVE MG/DL
KETONES UR STRIP.AUTO-MCNC: NEGATIVE MG/DL
LEUKOCYTE ESTERASE UR QL STRIP.AUTO: NEGATIVE
LYMPHOCYTES # BLD AUTO: 2.33 X10(3) UL (ref 1–4)
LYMPHOCYTES NFR BLD AUTO: 24 %
MCH RBC QN AUTO: 30.6 PG (ref 26–34)
MCHC RBC AUTO-ENTMCNC: 32.9 G/DL (ref 31–37)
MCV RBC AUTO: 93.1 FL
MONOCYTES # BLD AUTO: 0.84 X10(3) UL (ref 0.1–1)
MONOCYTES NFR BLD AUTO: 8.7 %
MULTISTIX LOT#: ABNORMAL NUMERIC
NEUTROPHILS # BLD AUTO: 6.17 X10 (3) UL (ref 1.5–7.7)
NEUTROPHILS # BLD AUTO: 6.17 X10(3) UL (ref 1.5–7.7)
NEUTROPHILS NFR BLD AUTO: 63.5 %
NITRITE UR QL STRIP.AUTO: NEGATIVE
NITRITE, URINE: NEGATIVE
OCCULT BLOOD: NEGATIVE
OSMOLALITY SERPL CALC.SUM OF ELEC: 286 MOSM/KG (ref 275–295)
PH UR STRIP.AUTO: 8 [PH] (ref 5–8)
PH, URINE: 6 (ref 4.5–8)
PLATELET # BLD AUTO: 252 10(3)UL (ref 150–450)
POTASSIUM SERPL-SCNC: 4.6 MMOL/L (ref 3.5–5.1)
PROT SERPL-MCNC: 7.7 G/DL (ref 6.4–8.2)
PROT UR STRIP.AUTO-MCNC: NEGATIVE MG/DL
PROTEIN (URINE DIPSTICK): NEGATIVE MG/DL
RBC # BLD AUTO: 4.61 X10(6)UL
RBC UR QL AUTO: NEGATIVE
SODIUM SERPL-SCNC: 136 MMOL/L (ref 136–145)
SP GR UR STRIP.AUTO: 1 (ref 1–1.03)
SPECIFIC GRAVITY: 1.02 (ref 1–1.03)
URINE-COLOR: YELLOW
UROBILINOGEN UR STRIP.AUTO-MCNC: <2 MG/DL
UROBILINOGEN,SEMI-QN: 0.2 MG/DL (ref 0–1.9)
WBC # BLD AUTO: 9.7 X10(3) UL (ref 4–11)

## 2023-06-29 PROCEDURE — 81003 URINALYSIS AUTO W/O SCOPE: CPT

## 2023-06-29 PROCEDURE — 80053 COMPREHEN METABOLIC PANEL: CPT

## 2023-06-29 PROCEDURE — 36415 COLL VENOUS BLD VENIPUNCTURE: CPT

## 2023-06-29 PROCEDURE — 93000 ELECTROCARDIOGRAM COMPLETE: CPT

## 2023-06-29 PROCEDURE — 99215 OFFICE O/P EST HI 40 MIN: CPT

## 2023-06-29 PROCEDURE — 85025 COMPLETE CBC W/AUTO DIFF WBC: CPT

## 2023-06-29 RX ORDER — NITROFURANTOIN 25; 75 MG/1; MG/1
100 CAPSULE ORAL 2 TIMES DAILY
Qty: 20 CAPSULE | Refills: 0 | Status: SHIPPED | OUTPATIENT
Start: 2023-06-29

## 2023-06-30 ENCOUNTER — HOSPITAL ENCOUNTER (OUTPATIENT)
Dept: GENERAL RADIOLOGY | Age: 88
Discharge: HOME | End: 2023-06-30
Payer: MEDICARE

## 2023-06-30 DIAGNOSIS — M54.6 ACUTE LEFT-SIDED THORACIC BACK PAIN: ICD-10-CM

## 2023-06-30 PROCEDURE — 72072 X-RAY EXAM THORAC SPINE 3VWS: CPT

## 2023-07-01 LAB
ATRIAL RATE: 66 BPM
P AXIS: 99 DEGREES
P-R INTERVAL: 200 MS
Q-T INTERVAL: 422 MS
QRS DURATION: 86 MS
QTC CALCULATION (BEZET): 442 MS
R AXIS: -7 DEGREES
T AXIS: 7 DEGREES
VENTRICULAR RATE: 66 BPM

## 2023-09-14 DIAGNOSIS — I10 BENIGN ESSENTIAL HYPERTENSION: Primary | ICD-10-CM

## 2023-09-15 RX ORDER — BISOPROLOL FUMARATE 5 MG/1
5 TABLET, FILM COATED ORAL DAILY
Qty: 90 TABLET | Refills: 3 | Status: SHIPPED | OUTPATIENT
Start: 2023-09-15

## (undated) NOTE — LETTER
22    Re:  Maria Eugenia Carrier  : 3/11/1934    Dear Pauly De La Torre tested positive prior to her cataract surgery. She has now completed her quarantine period and has no symptoms. She is now medically clear for cataract surgery.   She does no